# Patient Record
Sex: FEMALE | Race: WHITE | NOT HISPANIC OR LATINO | Employment: OTHER | URBAN - NONMETROPOLITAN AREA
[De-identification: names, ages, dates, MRNs, and addresses within clinical notes are randomized per-mention and may not be internally consistent; named-entity substitution may affect disease eponyms.]

---

## 2023-02-13 PROBLEM — E78.5 HYPERLIPEMIA: Status: ACTIVE | Noted: 2023-02-13

## 2023-02-13 PROBLEM — R06.00 DYSPNEA: Status: ACTIVE | Noted: 2023-02-13

## 2023-02-13 PROBLEM — K26.9 DUODENAL ULCER: Status: ACTIVE | Noted: 2023-02-13

## 2023-02-13 PROBLEM — R53.1 GENERALIZED WEAKNESS: Status: ACTIVE | Noted: 2023-02-13

## 2023-02-13 PROBLEM — G47.00 INSOMNIA: Status: ACTIVE | Noted: 2023-02-13

## 2023-02-13 PROBLEM — M46.1 BILATERAL SACROILIITIS (CMS-HCC): Status: ACTIVE | Noted: 2023-02-13

## 2023-02-13 PROBLEM — L30.9 DERMATITIS: Status: ACTIVE | Noted: 2023-02-13

## 2023-02-13 PROBLEM — L82.1 SEBORRHEIC KERATOSIS: Status: ACTIVE | Noted: 2023-02-13

## 2023-02-13 PROBLEM — K63.5 COLON POLYPS: Status: ACTIVE | Noted: 2023-02-13

## 2023-02-13 PROBLEM — R26.2 DIFFICULTY IN WALKING: Status: ACTIVE | Noted: 2023-02-13

## 2023-02-13 PROBLEM — S39.012A STRAIN OF BACK: Status: ACTIVE | Noted: 2023-02-13

## 2023-02-13 PROBLEM — M54.9 BACK PAIN: Status: ACTIVE | Noted: 2023-02-13

## 2023-02-13 PROBLEM — G47.33 OSA (OBSTRUCTIVE SLEEP APNEA): Status: ACTIVE | Noted: 2023-02-13

## 2023-02-13 PROBLEM — R07.89 CHEST WALL PAIN: Status: ACTIVE | Noted: 2023-02-13

## 2023-02-13 PROBLEM — R09.81 SINUS CONGESTION: Status: ACTIVE | Noted: 2023-02-13

## 2023-02-13 PROBLEM — E03.9 HYPOTHYROIDISM: Status: ACTIVE | Noted: 2023-02-13

## 2023-02-13 PROBLEM — Z86.39 H/O HYPERGLYCEMIA: Status: ACTIVE | Noted: 2023-02-13

## 2023-02-13 PROBLEM — K14.6 TONGUE PAIN: Status: ACTIVE | Noted: 2023-02-13

## 2023-02-13 PROBLEM — G47.9 DIFFICULTY SLEEPING: Status: ACTIVE | Noted: 2023-02-13

## 2023-02-13 PROBLEM — M81.0 OSTEOPOROSIS: Status: ACTIVE | Noted: 2023-02-13

## 2023-02-13 PROBLEM — M25.551 RIGHT HIP PAIN: Status: ACTIVE | Noted: 2023-02-13

## 2023-02-13 PROBLEM — B00.9 RECURRENT HSV (HERPES SIMPLEX VIRUS): Status: ACTIVE | Noted: 2023-02-13

## 2023-02-13 PROBLEM — R32 URINARY INCONTINENCE IN FEMALE: Status: ACTIVE | Noted: 2023-02-13

## 2023-02-13 PROBLEM — K21.9 GERD (GASTROESOPHAGEAL REFLUX DISEASE): Status: ACTIVE | Noted: 2023-02-13

## 2023-02-13 PROBLEM — R39.9 URINARY SYMPTOM OR SIGN: Status: ACTIVE | Noted: 2023-02-13

## 2023-02-13 PROBLEM — I10 BENIGN ESSENTIAL HYPERTENSION: Status: ACTIVE | Noted: 2023-02-13

## 2023-02-13 PROBLEM — J18.9 PNEUMONIA: Status: ACTIVE | Noted: 2023-02-13

## 2023-02-13 PROBLEM — R58 ECCHYMOSIS: Status: ACTIVE | Noted: 2023-02-13

## 2023-02-13 PROBLEM — J30.9 ALLERGIC RHINITIS: Status: ACTIVE | Noted: 2023-02-13

## 2023-02-13 PROBLEM — J45.909 ASTHMA (HHS-HCC): Status: ACTIVE | Noted: 2023-02-13

## 2023-02-13 PROBLEM — M16.10 HIP ARTHRITIS: Status: ACTIVE | Noted: 2023-02-13

## 2023-02-13 PROBLEM — J01.90 ACUTE SINUSITIS: Status: ACTIVE | Noted: 2023-02-13

## 2023-02-13 PROBLEM — R10.9 RIGHT FLANK PAIN: Status: ACTIVE | Noted: 2023-02-13

## 2023-02-13 PROBLEM — R06.02 SHORTNESS OF BREATH ON EXERTION: Status: ACTIVE | Noted: 2023-02-13

## 2023-02-13 PROBLEM — E55.9 VITAMIN D DEFICIENCY: Status: ACTIVE | Noted: 2023-02-13

## 2023-02-13 PROBLEM — F41.1 GENERALIZED ANXIETY DISORDER: Status: ACTIVE | Noted: 2023-02-13

## 2023-02-13 PROBLEM — M54.17 RADICULITIS, LUMBOSACRAL: Status: ACTIVE | Noted: 2023-02-13

## 2023-02-13 PROBLEM — F41.8 DEPRESSION WITH ANXIETY: Status: ACTIVE | Noted: 2023-02-13

## 2023-02-13 PROBLEM — R42 VERTIGO: Status: ACTIVE | Noted: 2023-02-13

## 2023-02-13 PROBLEM — M48.061 SPINAL STENOSIS, LUMBAR: Status: ACTIVE | Noted: 2023-02-13

## 2023-02-13 PROBLEM — M53.9 MULTILEVEL DEGENERATIVE DISC DISEASE: Status: ACTIVE | Noted: 2023-02-13

## 2023-02-13 PROBLEM — K57.30 DIVERTICULOSIS OF COLON WITHOUT HEMORRHAGE: Status: ACTIVE | Noted: 2023-02-13

## 2023-02-13 PROBLEM — R05.9 COUGH: Status: ACTIVE | Noted: 2023-02-13

## 2023-02-13 PROBLEM — G70.00 MYASTHENIA GRAVIS (MULTI): Status: ACTIVE | Noted: 2023-02-13

## 2023-02-13 PROBLEM — T21.14XA: Status: ACTIVE | Noted: 2023-02-13

## 2023-02-13 RX ORDER — LORAZEPAM 0.5 MG/1
0.5 TABLET ORAL 3 TIMES DAILY PRN
COMMUNITY
Start: 2021-07-28 | End: 2023-04-05 | Stop reason: ALTCHOICE

## 2023-02-13 RX ORDER — TRIAMCINOLONE ACETONIDE 1 MG/ML
LOTION TOPICAL 2 TIMES DAILY
COMMUNITY
Start: 2021-03-03 | End: 2023-04-05 | Stop reason: ALTCHOICE

## 2023-02-13 RX ORDER — PANTOPRAZOLE SODIUM 40 MG/1
40 TABLET, DELAYED RELEASE ORAL
COMMUNITY
Start: 2018-05-23 | End: 2023-04-05 | Stop reason: SDUPTHER

## 2023-02-13 RX ORDER — FLUTICASONE PROPIONATE 50 MCG
2 SPRAY, SUSPENSION (ML) NASAL
COMMUNITY
Start: 2017-11-27 | End: 2023-06-29

## 2023-02-13 RX ORDER — LORATADINE 10 MG/1
TABLET ORAL
COMMUNITY

## 2023-02-13 RX ORDER — ESCITALOPRAM OXALATE 10 MG/1
10 TABLET ORAL
COMMUNITY
Start: 2018-11-27 | End: 2024-01-19

## 2023-02-13 RX ORDER — LOSARTAN POTASSIUM 100 MG/1
100 TABLET ORAL
COMMUNITY
Start: 2019-12-02 | End: 2023-04-05 | Stop reason: SDUPTHER

## 2023-02-13 RX ORDER — DENOSUMAB 60 MG/ML
60 INJECTION SUBCUTANEOUS
COMMUNITY
Start: 2021-03-03

## 2023-02-13 RX ORDER — PYRIDOSTIGMINE BROMIDE 60 MG/1
60 TABLET ORAL 3 TIMES DAILY
COMMUNITY
Start: 2018-05-29

## 2023-02-13 RX ORDER — ERGOCALCIFEROL (VITAMIN D2) 50 MCG
2000 CAPSULE ORAL
COMMUNITY
Start: 2018-02-05 | End: 2024-04-24 | Stop reason: WASHOUT

## 2023-02-13 RX ORDER — MYCOPHENOLATE MOFETIL 500 MG/1
1000 TABLET ORAL 2 TIMES DAILY
COMMUNITY
Start: 2018-04-12

## 2023-02-13 RX ORDER — TRAZODONE HYDROCHLORIDE 50 MG/1
50 TABLET ORAL NIGHTLY
COMMUNITY
Start: 2018-07-17 | End: 2023-04-05 | Stop reason: SDUPTHER

## 2023-02-13 RX ORDER — AMLODIPINE BESYLATE 10 MG/1
10 TABLET ORAL
COMMUNITY
Start: 2018-12-19 | End: 2023-04-05 | Stop reason: SDUPTHER

## 2023-02-13 RX ORDER — MELOXICAM 7.5 MG/1
7.5 TABLET ORAL DAILY PRN
COMMUNITY
Start: 2022-08-03 | End: 2023-04-05 | Stop reason: ALTCHOICE

## 2023-02-13 RX ORDER — ALBUTEROL SULFATE 90 UG/1
2 AEROSOL, METERED RESPIRATORY (INHALATION) EVERY 4 HOURS PRN
COMMUNITY
Start: 2017-04-05 | End: 2023-10-26

## 2023-02-13 RX ORDER — ESCITALOPRAM OXALATE 5 MG/1
5 TABLET ORAL
COMMUNITY
Start: 2019-11-06 | End: 2023-04-05 | Stop reason: SDUPTHER

## 2023-02-13 RX ORDER — HYDROCHLOROTHIAZIDE 12.5 MG/1
12.5 CAPSULE ORAL
COMMUNITY
Start: 2020-08-11 | End: 2023-08-14

## 2023-02-13 RX ORDER — VALACYCLOVIR HYDROCHLORIDE 1 G/1
1 TABLET, FILM COATED ORAL
COMMUNITY
Start: 2019-05-10 | End: 2024-06-06

## 2023-02-13 RX ORDER — PRAVASTATIN SODIUM 10 MG/1
10 TABLET ORAL
COMMUNITY
Start: 2015-08-18 | End: 2023-04-05 | Stop reason: SDUPTHER

## 2023-02-13 RX ORDER — BLOOD SUGAR DIAGNOSTIC
STRIP MISCELLANEOUS
COMMUNITY
Start: 2020-08-11 | End: 2023-04-05 | Stop reason: SDUPTHER

## 2023-02-13 RX ORDER — GLUCOSAMINE/CHONDR SU A SOD 750-600 MG
5-10 TABLET ORAL NIGHTLY
COMMUNITY

## 2023-02-13 RX ORDER — MONTELUKAST SODIUM 10 MG/1
10 TABLET ORAL
COMMUNITY
Start: 2022-01-12 | End: 2023-04-05 | Stop reason: SDUPTHER

## 2023-02-13 RX ORDER — LEVOTHYROXINE SODIUM 112 UG/1
112 TABLET ORAL
COMMUNITY
Start: 2021-08-17 | End: 2023-04-05 | Stop reason: SDUPTHER

## 2023-02-13 RX ORDER — BLOOD SUGAR DIAGNOSTIC
STRIP MISCELLANEOUS
COMMUNITY
Start: 2018-02-05 | End: 2023-04-05 | Stop reason: ALTCHOICE

## 2023-02-13 RX ORDER — BUDESONIDE AND FORMOTEROL FUMARATE DIHYDRATE 80; 4.5 UG/1; UG/1
2 AEROSOL RESPIRATORY (INHALATION) 2 TIMES DAILY
COMMUNITY
Start: 2021-11-12 | End: 2023-04-05 | Stop reason: SDUPTHER

## 2023-04-04 PROBLEM — L30.9 DERMATITIS: Status: RESOLVED | Noted: 2023-02-13 | Resolved: 2023-04-04

## 2023-04-04 PROBLEM — R05.9 COUGH: Status: RESOLVED | Noted: 2023-02-13 | Resolved: 2023-04-04

## 2023-04-04 PROBLEM — R58 ECCHYMOSIS: Status: RESOLVED | Noted: 2023-02-13 | Resolved: 2023-04-04

## 2023-04-04 PROBLEM — T21.14XA: Status: RESOLVED | Noted: 2023-02-13 | Resolved: 2023-04-04

## 2023-04-04 PROBLEM — R09.81 SINUS CONGESTION: Status: RESOLVED | Noted: 2023-02-13 | Resolved: 2023-04-04

## 2023-04-04 PROBLEM — J01.90 ACUTE SINUSITIS: Status: RESOLVED | Noted: 2023-02-13 | Resolved: 2023-04-04

## 2023-04-04 PROBLEM — R07.89 CHEST WALL PAIN: Status: RESOLVED | Noted: 2023-02-13 | Resolved: 2023-04-04

## 2023-04-04 PROBLEM — M54.9 BACK PAIN: Status: RESOLVED | Noted: 2023-02-13 | Resolved: 2023-04-04

## 2023-04-04 PROBLEM — Z86.39 H/O HYPERGLYCEMIA: Status: RESOLVED | Noted: 2023-02-13 | Resolved: 2023-04-04

## 2023-04-04 PROBLEM — J18.9 PNEUMONIA: Status: RESOLVED | Noted: 2023-02-13 | Resolved: 2023-04-04

## 2023-04-05 ENCOUNTER — OFFICE VISIT (OUTPATIENT)
Dept: PRIMARY CARE | Facility: CLINIC | Age: 73
End: 2023-04-05
Payer: MEDICARE

## 2023-04-05 VITALS
DIASTOLIC BLOOD PRESSURE: 67 MMHG | WEIGHT: 222.4 LBS | RESPIRATION RATE: 12 BRPM | TEMPERATURE: 98.6 F | BODY MASS INDEX: 39.41 KG/M2 | HEIGHT: 63 IN | HEART RATE: 66 BPM | SYSTOLIC BLOOD PRESSURE: 121 MMHG | OXYGEN SATURATION: 95 %

## 2023-04-05 DIAGNOSIS — E03.9 HYPOTHYROIDISM, UNSPECIFIED TYPE: ICD-10-CM

## 2023-04-05 DIAGNOSIS — D22.9 MULTIPLE ATYPICAL SKIN MOLES: ICD-10-CM

## 2023-04-05 DIAGNOSIS — F51.01 PRIMARY INSOMNIA: ICD-10-CM

## 2023-04-05 DIAGNOSIS — F41.1 GENERALIZED ANXIETY DISORDER: ICD-10-CM

## 2023-04-05 DIAGNOSIS — M81.8 OTHER OSTEOPOROSIS, UNSPECIFIED PATHOLOGICAL FRACTURE PRESENCE: ICD-10-CM

## 2023-04-05 DIAGNOSIS — E78.5 HYPERLIPIDEMIA, UNSPECIFIED HYPERLIPIDEMIA TYPE: ICD-10-CM

## 2023-04-05 DIAGNOSIS — R42 VERTIGO: ICD-10-CM

## 2023-04-05 DIAGNOSIS — J32.9 CHRONIC CONGESTION OF PARANASAL SINUS: ICD-10-CM

## 2023-04-05 DIAGNOSIS — E11.9 CONTROLLED TYPE 2 DIABETES MELLITUS WITHOUT COMPLICATION, WITHOUT LONG-TERM CURRENT USE OF INSULIN (MULTI): ICD-10-CM

## 2023-04-05 DIAGNOSIS — J45.30 MILD PERSISTENT ASTHMA WITHOUT COMPLICATION (HHS-HCC): ICD-10-CM

## 2023-04-05 DIAGNOSIS — F41.8 DEPRESSION WITH ANXIETY: ICD-10-CM

## 2023-04-05 DIAGNOSIS — I10 PRIMARY HYPERTENSION: ICD-10-CM

## 2023-04-05 DIAGNOSIS — M53.9 MULTILEVEL DEGENERATIVE DISC DISEASE: ICD-10-CM

## 2023-04-05 DIAGNOSIS — K21.9 GASTROESOPHAGEAL REFLUX DISEASE WITHOUT ESOPHAGITIS: ICD-10-CM

## 2023-04-05 DIAGNOSIS — Z00.00 ROUTINE GENERAL MEDICAL EXAMINATION AT HEALTH CARE FACILITY: Primary | ICD-10-CM

## 2023-04-05 DIAGNOSIS — G47.33 OSA (OBSTRUCTIVE SLEEP APNEA): ICD-10-CM

## 2023-04-05 PROBLEM — R39.9 URINARY SYMPTOM OR SIGN: Status: RESOLVED | Noted: 2023-02-13 | Resolved: 2023-04-05

## 2023-04-05 PROBLEM — R49.0 DYSPHONIA: Status: ACTIVE | Noted: 2018-01-21

## 2023-04-05 PROBLEM — J45.909 ASTHMA (HHS-HCC): Status: RESOLVED | Noted: 2023-02-13 | Resolved: 2023-04-05

## 2023-04-05 PROBLEM — G47.9 DIFFICULTY SLEEPING: Status: RESOLVED | Noted: 2023-02-13 | Resolved: 2023-04-05

## 2023-04-05 PROBLEM — M48.061 SPINAL STENOSIS, LUMBAR: Status: RESOLVED | Noted: 2023-02-13 | Resolved: 2023-04-05

## 2023-04-05 PROBLEM — R10.9 RIGHT FLANK PAIN: Status: RESOLVED | Noted: 2023-02-13 | Resolved: 2023-04-05

## 2023-04-05 PROBLEM — G70.00 MYASTHENIA GRAVIS (MULTI): Status: RESOLVED | Noted: 2023-02-13 | Resolved: 2023-04-05

## 2023-04-05 PROBLEM — R13.10 DYSPHAGIA: Status: ACTIVE | Noted: 2018-01-21

## 2023-04-05 PROBLEM — S39.012A STRAIN OF BACK: Status: RESOLVED | Noted: 2023-02-13 | Resolved: 2023-04-05

## 2023-04-05 PROCEDURE — 1170F FXNL STATUS ASSESSED: CPT | Performed by: FAMILY MEDICINE

## 2023-04-05 PROCEDURE — 3074F SYST BP LT 130 MM HG: CPT | Performed by: FAMILY MEDICINE

## 2023-04-05 PROCEDURE — 1157F ADVNC CARE PLAN IN RCRD: CPT | Performed by: FAMILY MEDICINE

## 2023-04-05 PROCEDURE — 1160F RVW MEDS BY RX/DR IN RCRD: CPT | Performed by: FAMILY MEDICINE

## 2023-04-05 PROCEDURE — 1036F TOBACCO NON-USER: CPT | Performed by: FAMILY MEDICINE

## 2023-04-05 PROCEDURE — 3078F DIAST BP <80 MM HG: CPT | Performed by: FAMILY MEDICINE

## 2023-04-05 PROCEDURE — 1159F MED LIST DOCD IN RCRD: CPT | Performed by: FAMILY MEDICINE

## 2023-04-05 PROCEDURE — G0439 PPPS, SUBSEQ VISIT: HCPCS | Performed by: FAMILY MEDICINE

## 2023-04-05 PROCEDURE — 4010F ACE/ARB THERAPY RXD/TAKEN: CPT | Performed by: FAMILY MEDICINE

## 2023-04-05 RX ORDER — BLOOD SUGAR DIAGNOSTIC
STRIP MISCELLANEOUS
Qty: 50 STRIP | Refills: 2 | Status: SHIPPED | OUTPATIENT
Start: 2023-04-05

## 2023-04-05 RX ORDER — ESCITALOPRAM OXALATE 5 MG/1
5 TABLET ORAL
Qty: 90 TABLET | Refills: 2 | Status: SHIPPED | OUTPATIENT
Start: 2023-04-05 | End: 2023-12-08

## 2023-04-05 RX ORDER — AMLODIPINE BESYLATE 10 MG/1
10 TABLET ORAL
Qty: 90 TABLET | Refills: 2 | Status: SHIPPED | OUTPATIENT
Start: 2023-04-05 | End: 2023-08-14

## 2023-04-05 RX ORDER — LOSARTAN POTASSIUM 100 MG/1
100 TABLET ORAL
Qty: 90 TABLET | Refills: 2 | Status: SHIPPED | OUTPATIENT
Start: 2023-04-05 | End: 2023-08-14

## 2023-04-05 RX ORDER — BUDESONIDE AND FORMOTEROL FUMARATE DIHYDRATE 80; 4.5 UG/1; UG/1
2 AEROSOL RESPIRATORY (INHALATION) 2 TIMES DAILY
Qty: 1 EACH | Refills: 2 | Status: SHIPPED | OUTPATIENT
Start: 2023-04-05 | End: 2023-09-05

## 2023-04-05 RX ORDER — PANTOPRAZOLE SODIUM 40 MG/1
40 TABLET, DELAYED RELEASE ORAL
Qty: 90 TABLET | Refills: 2 | Status: SHIPPED | OUTPATIENT
Start: 2023-04-05 | End: 2023-12-05 | Stop reason: SDUPTHER

## 2023-04-05 RX ORDER — PRAVASTATIN SODIUM 10 MG/1
10 TABLET ORAL
Qty: 90 TABLET | Refills: 3 | Status: SHIPPED | OUTPATIENT
Start: 2023-04-05 | End: 2023-12-05

## 2023-04-05 RX ORDER — LEVOTHYROXINE SODIUM 112 UG/1
112 TABLET ORAL
Qty: 90 TABLET | Refills: 3 | Status: SHIPPED | OUTPATIENT
Start: 2023-04-05 | End: 2023-12-05

## 2023-04-05 RX ORDER — MONTELUKAST SODIUM 10 MG/1
10 TABLET ORAL
Qty: 90 TABLET | Refills: 2 | Status: SHIPPED | OUTPATIENT
Start: 2023-04-05 | End: 2024-01-19

## 2023-04-05 RX ORDER — TRAZODONE HYDROCHLORIDE 50 MG/1
50 TABLET ORAL NIGHTLY
Qty: 90 TABLET | Refills: 3 | Status: SHIPPED | OUTPATIENT
Start: 2023-04-05 | End: 2024-06-04

## 2023-04-05 ASSESSMENT — PATIENT HEALTH QUESTIONNAIRE - PHQ9
1. LITTLE INTEREST OR PLEASURE IN DOING THINGS: MORE THAN HALF THE DAYS
6. FEELING BAD ABOUT YOURSELF - OR THAT YOU ARE A FAILURE OR HAVE LET YOURSELF OR YOUR FAMILY DOWN: SEVERAL DAYS
10. IF YOU CHECKED OFF ANY PROBLEMS, HOW DIFFICULT HAVE THESE PROBLEMS MADE IT FOR YOU TO DO YOUR WORK, TAKE CARE OF THINGS AT HOME, OR GET ALONG WITH OTHER PEOPLE: SOMEWHAT DIFFICULT
9. THOUGHTS THAT YOU WOULD BE BETTER OFF DEAD, OR OF HURTING YOURSELF: NOT AT ALL
SUM OF ALL RESPONSES TO PHQ9 QUESTIONS 1 AND 2: 4
3. TROUBLE FALLING OR STAYING ASLEEP OR SLEEPING TOO MUCH: SEVERAL DAYS
SUM OF ALL RESPONSES TO PHQ QUESTIONS 1-9: 11
4. FEELING TIRED OR HAVING LITTLE ENERGY: NEARLY EVERY DAY
7. TROUBLE CONCENTRATING ON THINGS, SUCH AS READING THE NEWSPAPER OR WATCHING TELEVISION: NOT AT ALL
5. POOR APPETITE OR OVEREATING: MORE THAN HALF THE DAYS
8. MOVING OR SPEAKING SO SLOWLY THAT OTHER PEOPLE COULD HAVE NOTICED. OR THE OPPOSITE, BEING SO FIGETY OR RESTLESS THAT YOU HAVE BEEN MOVING AROUND A LOT MORE THAN USUAL: NOT AT ALL
2. FEELING DOWN, DEPRESSED OR HOPELESS: MORE THAN HALF THE DAYS

## 2023-04-05 ASSESSMENT — ACTIVITIES OF DAILY LIVING (ADL)
TAKING_MEDICATION: INDEPENDENT
DRESSING: INDEPENDENT
MANAGING_FINANCES: INDEPENDENT
GROCERY_SHOPPING: INDEPENDENT
BATHING: INDEPENDENT
DOING_HOUSEWORK: INDEPENDENT

## 2023-04-05 ASSESSMENT — ANXIETY QUESTIONNAIRES
2. NOT BEING ABLE TO STOP OR CONTROL WORRYING: MORE THAN HALF THE DAYS
6. BECOMING EASILY ANNOYED OR IRRITABLE: SEVERAL DAYS
IF YOU CHECKED OFF ANY PROBLEMS ON THIS QUESTIONNAIRE, HOW DIFFICULT HAVE THESE PROBLEMS MADE IT FOR YOU TO DO YOUR WORK, TAKE CARE OF THINGS AT HOME, OR GET ALONG WITH OTHER PEOPLE: SOMEWHAT DIFFICULT
1. FEELING NERVOUS, ANXIOUS, OR ON EDGE: SEVERAL DAYS
GAD7 TOTAL SCORE: 8
3. WORRYING TOO MUCH ABOUT DIFFERENT THINGS: MORE THAN HALF THE DAYS
4. TROUBLE RELAXING: SEVERAL DAYS
5. BEING SO RESTLESS THAT IT IS HARD TO SIT STILL: NOT AT ALL
7. FEELING AFRAID AS IF SOMETHING AWFUL MIGHT HAPPEN: SEVERAL DAYS

## 2023-04-05 ASSESSMENT — ENCOUNTER SYMPTOMS
DEPRESSION: 1
OCCASIONAL FEELINGS OF UNSTEADINESS: 1
LOSS OF SENSATION IN FEET: 0

## 2023-04-05 NOTE — PROGRESS NOTES
"Subjective   Reason for Visit: Nam Bernardo is an 72 y.o. female here for a Medicare Wellness visit.          Reviewed all medications by prescribing practitioner or clinical pharmacist (such as prescriptions, OTCs, herbal therapies and supplements) and documented in the medical record.    Follow Up Visit, past visit note reviewed.   Concerns   1. Sometimes room spins when gets up from laying  2. Congested all the time, sinuses, andleft ear always plugged  3.  Tired  - when going to sleep and when getting up .   4. Moles to be checked.        Sleep :  10-11 pm  and up at 7 -8 am .  Multiple wakings not sure why .  Hx TEJINDER, further follow up  was postponed due to spouse's death.  Some knee sxs,may return to [facility] .  Patient Care Team:  Leah Williamson MD as PCP - General     Review of Systems    Objective   Vitals:  /67 (BP Location: Right arm, Patient Position: Sitting, BP Cuff Size: Adult)   Pulse 66   Temp 37 °C (98.6 °F) (Temporal)   Resp 12   Ht 1.6 m (5' 3\")   Wt 101 kg (222 lb 6.4 oz)   SpO2 95%   BMI 39.40 kg/m²       Physical Exam    Assessment/Plan   Problem List Items Addressed This Visit       Depression with anxiety    Relevant Medications    traZODone (Desyrel) 50 mg tablet    escitalopram (Lexapro) 5 mg tablet    Generalized anxiety disorder    GERD (gastroesophageal reflux disease)    Relevant Medications    pantoprazole (ProtoNix) 40 mg EC tablet    Hyperlipemia    Relevant Medications    pravastatin (Pravachol) 10 mg tablet    Hypothyroidism    Relevant Medications    levothyroxine (Synthroid, Levoxyl) 112 mcg tablet    Other Relevant Orders    TSH with reflex to Free T4 if abnormal    Insomnia    Relevant Medications    traZODone (Desyrel) 50 mg tablet    Multilevel degenerative disc disease    TEJINDER (obstructive sleep apnea)    Relevant Orders    Home sleep apnea test (HSAT)    Follow Up In [facility] - PCP    Osteoporosis    Vertigo    Controlled type 2 diabetes " mellitus without complication, without long-term current use of insulin (CMS/[facility])    Relevant Medications    OneTouch Ultra Test strip    Other Relevant Orders    Follow Up In [facility] - PCP    Hemoglobin A1C    Comprehensive Metabolic Panel     Other Visit Diagnoses       Routine general medical examination at health care facility    -  Primary    Mild persistent asthma without complication        Relevant Medications    budesonide-formoteroL (Symbicort) 80-4.5 mcg/actuation inhaler    montelukast (Singulair) 10 mg tablet    Primary hypertension        Relevant Medications    losartan (Cozaar) 100 mg tablet    amLODIPine (Norvasc) 10 mg tablet    Other Relevant Orders    Follow Up In [facility] - PCP    Chronic congestion of paranasal sinus        Relevant Orders    Referral to ENT

## 2023-04-05 NOTE — LETTER
April 5, 2023     Patient: Nam Bernardo   YOB: 1950   Date of Visit: 4/5/2023       To Whom It May Concern:    It is my medical opinion that Ms. Bernardo requires a disability parking placard for the following reasons:  She cannot walk without assistance from another person or the use of an assistance device (cane, crutch, prosthetic device, wheelchair, etc.).  Duration of need: permanent    If you have any questions or concerns, please don't hesitate to call.         Sincerely,        Leah Williamson MD

## 2023-04-21 ENCOUNTER — OFFICE VISIT (OUTPATIENT)
Dept: PRIMARY CARE | Facility: CLINIC | Age: 73
End: 2023-04-21
Payer: MEDICARE

## 2023-04-21 ENCOUNTER — APPOINTMENT (OUTPATIENT)
Dept: PRIMARY CARE | Facility: CLINIC | Age: 73
End: 2023-04-21
Payer: MEDICARE

## 2023-04-21 VITALS
BODY MASS INDEX: 39.57 KG/M2 | HEART RATE: 71 BPM | OXYGEN SATURATION: 95 % | DIASTOLIC BLOOD PRESSURE: 64 MMHG | WEIGHT: 223.4 LBS | TEMPERATURE: 98.4 F | RESPIRATION RATE: 12 BRPM | SYSTOLIC BLOOD PRESSURE: 124 MMHG

## 2023-04-21 DIAGNOSIS — R09.81 SINUS CONGESTION: ICD-10-CM

## 2023-04-21 DIAGNOSIS — R42 VERTIGO: Primary | ICD-10-CM

## 2023-04-21 PROCEDURE — 3078F DIAST BP <80 MM HG: CPT | Performed by: FAMILY MEDICINE

## 2023-04-21 PROCEDURE — 1159F MED LIST DOCD IN RCRD: CPT | Performed by: FAMILY MEDICINE

## 2023-04-21 PROCEDURE — 1160F RVW MEDS BY RX/DR IN RCRD: CPT | Performed by: FAMILY MEDICINE

## 2023-04-21 PROCEDURE — 1157F ADVNC CARE PLAN IN RCRD: CPT | Performed by: FAMILY MEDICINE

## 2023-04-21 PROCEDURE — 99213 OFFICE O/P EST LOW 20 MIN: CPT | Performed by: FAMILY MEDICINE

## 2023-04-21 PROCEDURE — 3074F SYST BP LT 130 MM HG: CPT | Performed by: FAMILY MEDICINE

## 2023-04-21 PROCEDURE — 4010F ACE/ARB THERAPY RXD/TAKEN: CPT | Performed by: FAMILY MEDICINE

## 2023-04-21 PROCEDURE — 1036F TOBACCO NON-USER: CPT | Performed by: FAMILY MEDICINE

## 2023-04-21 RX ORDER — MECLIZINE HCL 12.5 MG 12.5 MG/1
12.5 TABLET ORAL 3 TIMES DAILY PRN
Qty: 12 TABLET | Refills: 0 | Status: SHIPPED | OUTPATIENT
Start: 2023-04-21 | End: 2023-04-25

## 2023-04-21 RX ORDER — AZITHROMYCIN 250 MG/1
TABLET, FILM COATED ORAL
Qty: 6 TABLET | Refills: 0 | Status: SHIPPED | OUTPATIENT
Start: 2023-04-21 | End: 2023-04-26

## 2023-04-21 NOTE — PROGRESS NOTES
Subjective:    Nam Bernardo is a 72 y.o. female who presents for No chief complaint on file.    PCP is ZAHIDA    HPI:    Has pt missed any dose of her BP medication?        What concern/ problem/pain/symptom  brings you here today?      how long has pt had sxs?      describe symptoms-      how often do symptoms occur?      has pt tried anything for current symptoms, including medications (OTC or prescription)  ?        what makes symptoms worse?      has pt been seen recently for this problem ( within past 2-3 weeks) ?    if yes- where?    by who?    what treatment was provided?    Pt has hx HTN- on amlodipine , hydrochlorothiazide, losartan     labs 4/5/23- were ordered     Future Appointments   Date Time Provider Department Center          7/17/2023  1:00 PM Leah Williamson MD [id number]         Social History     Tobacco Use   Smoking Status Never   Smokeless Tobacco Never   Vaping Use   Vaping Status Not on file         Review of Systems:      Objective:    There were no vitals filed for this visit.      Patient is alert and oriented x 3 , NAD  HEAD- normocephalic and atraumatic   EYES-conjunctiva-normal, lids - normal  EARS/NOSE- normal external exam   NECK-supple,FROM  CV- RRR without murmur  PULM- CTA bilaterally, normal respiratory effort  RESPIRATORY EFFORT- normal , no retractions or nasal flaring   ABD- normoactive BS's   EXT- no edema,NT  SKIN- no abnormal skin lesions noted  NEURO- no focal deficits  PSYCH- pleasant, normal judgement and insight                Assessment/Plan:    1. Benign essential hypertension            No orders of the defined types were placed in this encounter.            Call if no better or if symptoms worsen

## 2023-04-21 NOTE — PROGRESS NOTES
Subjective   Patient ID: Nam Bernardo is a 72 y.o. female who presents for Hypertension (/87 [place] fire station. Has been feeling dizzy. Has gone down since yesterday but still high at home readings.).  HPI  Sinus ss.  Dizziness ; spinning .  Sl nausea .    Had checked bp at home bc of diziness.  Elevated at home and at Fire [employer] .      Review of Systems    Objective   /64 (BP Location: Right arm, Patient Position: Sitting, BP Cuff Size: Large adult)   Pulse 71   Temp 36.9 °C (98.4 °F) (Temporal)   Resp 12   Wt 101 kg (223 lb 6.4 oz)   SpO2 95%   BMI 39.57 kg/m²     TM normal, no redness, infection.  Neck: no lymph nodes. Nose: congested. Throat pnd    Physical Exam  Vitals reviewed.   Constitutional:       General: She is not in acute distress.  Cardiovascular:      Rate and Rhythm: Normal rate and regular rhythm.      Heart sounds: Normal heart sounds.   Pulmonary:      Effort: Pulmonary effort is normal.      Breath sounds: No wheezing or rales.   Neurological:      General: No focal deficit present.      Mental Status: She is alert.         Assessment/Plan   Problem List Items Addressed This Visit          Medium    Vertigo - Primary    Relevant Medications    meclizine (Antivert) 12.5 mg tablet     Other Visit Diagnoses       Sinus congestion        Relevant Medications    azithromycin (Zithromax) 250 mg tablet            Elevated BP - normal hhere; reassurance.   Vertigo, congestion-  has had both these issues. Fear of illness triggereing myasthenia gravis flare.     Tx prescribed.  Offered PT for VT , she declines for now    MASOUD Williamson MD

## 2023-05-09 DIAGNOSIS — G47.33 OSA (OBSTRUCTIVE SLEEP APNEA): ICD-10-CM

## 2023-05-12 ENCOUNTER — TELEPHONE (OUTPATIENT)
Dept: PRIMARY CARE | Facility: CLINIC | Age: 73
End: 2023-05-12
Payer: MEDICARE

## 2023-05-12 DIAGNOSIS — G47.33 OSA (OBSTRUCTIVE SLEEP APNEA): Primary | ICD-10-CM

## 2023-05-12 NOTE — TELEPHONE ENCOUNTER
Abnormal Home Sleep study .   Called and discussed with Pt  . Recommend in lab sleep study ,  and titration .   Referral entered. Pt chose [facility] location :     [place] [facility], [place] [address]  [phone]  Fax [phone]    I gave her the phone number , advised to call to schedule.   We will fax the information .      20-Mar-2023 17:29

## 2023-05-12 NOTE — TELEPHONE ENCOUNTER
I am referring pt to have an in Lab Sleep Study .   Location     [place] [facility] [place]  [address] [id number]  [phone]  Fax [phone]    Pt is aware .     Please FAX -referral,  Demographics, and home sleep study results.     I printed the referral and Home Sleep Study . They are in  my outbox. Thx !!

## 2023-06-28 DIAGNOSIS — J30.9 ALLERGIC RHINITIS, UNSPECIFIED: ICD-10-CM

## 2023-06-29 RX ORDER — FLUTICASONE PROPIONATE 50 MCG
SPRAY, SUSPENSION (ML) NASAL
Qty: 16 G | Refills: 5 | Status: SHIPPED | OUTPATIENT
Start: 2023-06-29

## 2023-07-07 ENCOUNTER — APPOINTMENT (OUTPATIENT)
Dept: PRIMARY CARE | Facility: CLINIC | Age: 73
End: 2023-07-07
Payer: MEDICARE

## 2023-07-12 ENCOUNTER — LAB (OUTPATIENT)
Dept: LAB | Facility: LAB | Age: 73
End: 2023-07-12
Payer: MEDICARE

## 2023-07-12 DIAGNOSIS — E03.9 HYPOTHYROIDISM, UNSPECIFIED TYPE: ICD-10-CM

## 2023-07-12 DIAGNOSIS — E11.9 CONTROLLED TYPE 2 DIABETES MELLITUS WITHOUT COMPLICATION, WITHOUT LONG-TERM CURRENT USE OF INSULIN (MULTI): ICD-10-CM

## 2023-07-12 PROCEDURE — 84439 ASSAY OF FREE THYROXINE: CPT

## 2023-07-12 PROCEDURE — 36415 COLL VENOUS BLD VENIPUNCTURE: CPT

## 2023-07-12 PROCEDURE — 84443 ASSAY THYROID STIM HORMONE: CPT

## 2023-07-12 PROCEDURE — 80053 COMPREHEN METABOLIC PANEL: CPT

## 2023-07-12 PROCEDURE — 83036 HEMOGLOBIN GLYCOSYLATED A1C: CPT

## 2023-07-13 LAB
ALANINE AMINOTRANSFERASE (SGPT) (U/L) IN SER/PLAS: 14 U/L (ref 7–45)
ALBUMIN (G/DL) IN SER/PLAS: 4.6 G/DL (ref 3.4–5)
ALKALINE PHOSPHATASE (U/L) IN SER/PLAS: 55 U/L (ref 33–136)
ANION GAP IN SER/PLAS: 12 MMOL/L (ref 10–20)
ASPARTATE AMINOTRANSFERASE (SGOT) (U/L) IN SER/PLAS: 19 U/L (ref 9–39)
BILIRUBIN TOTAL (MG/DL) IN SER/PLAS: 0.6 MG/DL (ref 0–1.2)
CALCIUM (MG/DL) IN SER/PLAS: 9.7 MG/DL (ref 8.6–10.6)
CARBON DIOXIDE, TOTAL (MMOL/L) IN SER/PLAS: 28 MMOL/L (ref 21–32)
CHLORIDE (MMOL/L) IN SER/PLAS: 96 MMOL/L (ref 98–107)
CREATININE (MG/DL) IN SER/PLAS: 0.71 MG/DL (ref 0.5–1.05)
ESTIMATED AVERAGE GLUCOSE FOR HBA1C: 120 MG/DL
GFR FEMALE: 89 ML/MIN/1.73M2
GLUCOSE (MG/DL) IN SER/PLAS: 146 MG/DL (ref 74–99)
HEMOGLOBIN A1C/HEMOGLOBIN TOTAL IN BLOOD: 5.8 %
POTASSIUM (MMOL/L) IN SER/PLAS: 4.7 MMOL/L (ref 3.5–5.3)
PROTEIN TOTAL: 6.6 G/DL (ref 6.4–8.2)
SODIUM (MMOL/L) IN SER/PLAS: 131 MMOL/L (ref 136–145)
THYROTROPIN (MIU/L) IN SER/PLAS BY DETECTION LIMIT <= 0.05 MIU/L: 0.43 MIU/L (ref 0.44–3.98)
THYROXINE (T4) FREE (NG/DL) IN SER/PLAS: 1.35 NG/DL (ref 0.78–1.48)
UREA NITROGEN (MG/DL) IN SER/PLAS: 16 MG/DL (ref 6–23)

## 2023-07-17 ENCOUNTER — APPOINTMENT (OUTPATIENT)
Dept: PRIMARY CARE | Facility: CLINIC | Age: 73
End: 2023-07-17
Payer: MEDICARE

## 2023-07-20 ENCOUNTER — OFFICE VISIT (OUTPATIENT)
Dept: PRIMARY CARE | Facility: CLINIC | Age: 73
End: 2023-07-20
Payer: MEDICARE

## 2023-07-20 VITALS
WEIGHT: 223.8 LBS | OXYGEN SATURATION: 95 % | BODY MASS INDEX: 39.64 KG/M2 | HEART RATE: 67 BPM | SYSTOLIC BLOOD PRESSURE: 104 MMHG | DIASTOLIC BLOOD PRESSURE: 61 MMHG | TEMPERATURE: 97.8 F

## 2023-07-20 DIAGNOSIS — E11.9 CONTROLLED TYPE 2 DIABETES MELLITUS WITHOUT COMPLICATION, WITHOUT LONG-TERM CURRENT USE OF INSULIN (MULTI): ICD-10-CM

## 2023-07-20 DIAGNOSIS — J30.9 ALLERGIC RHINITIS, UNSPECIFIED SEASONALITY, UNSPECIFIED TRIGGER: Primary | ICD-10-CM

## 2023-07-20 DIAGNOSIS — E03.9 HYPOTHYROIDISM, UNSPECIFIED TYPE: ICD-10-CM

## 2023-07-20 DIAGNOSIS — E55.9 VITAMIN D DEFICIENCY: ICD-10-CM

## 2023-07-20 DIAGNOSIS — F41.8 DEPRESSION WITH ANXIETY: ICD-10-CM

## 2023-07-20 DIAGNOSIS — E78.5 HYPERLIPIDEMIA, UNSPECIFIED HYPERLIPIDEMIA TYPE: ICD-10-CM

## 2023-07-20 DIAGNOSIS — I10 PRIMARY HYPERTENSION: ICD-10-CM

## 2023-07-20 DIAGNOSIS — G47.33 OSA (OBSTRUCTIVE SLEEP APNEA): ICD-10-CM

## 2023-07-20 PROCEDURE — 99214 OFFICE O/P EST MOD 30 MIN: CPT | Performed by: FAMILY MEDICINE

## 2023-07-20 PROCEDURE — 3074F SYST BP LT 130 MM HG: CPT | Performed by: FAMILY MEDICINE

## 2023-07-20 PROCEDURE — 3044F HG A1C LEVEL LT 7.0%: CPT | Performed by: FAMILY MEDICINE

## 2023-07-20 PROCEDURE — 1159F MED LIST DOCD IN RCRD: CPT | Performed by: FAMILY MEDICINE

## 2023-07-20 PROCEDURE — 1036F TOBACCO NON-USER: CPT | Performed by: FAMILY MEDICINE

## 2023-07-20 PROCEDURE — 3078F DIAST BP <80 MM HG: CPT | Performed by: FAMILY MEDICINE

## 2023-07-20 PROCEDURE — 1160F RVW MEDS BY RX/DR IN RCRD: CPT | Performed by: FAMILY MEDICINE

## 2023-07-20 PROCEDURE — 1125F AMNT PAIN NOTED PAIN PRSNT: CPT | Performed by: FAMILY MEDICINE

## 2023-07-20 PROCEDURE — 1157F ADVNC CARE PLAN IN RCRD: CPT | Performed by: FAMILY MEDICINE

## 2023-07-20 PROCEDURE — 4010F ACE/ARB THERAPY RXD/TAKEN: CPT | Performed by: FAMILY MEDICINE

## 2023-07-20 RX ORDER — TRIAMCINOLONE ACETONIDE 1 MG/G
CREAM TOPICAL
COMMUNITY
Start: 2023-06-29 | End: 2024-01-26 | Stop reason: SDUPTHER

## 2023-07-20 ASSESSMENT — ENCOUNTER SYMPTOMS
FEVER: 0
SHORTNESS OF BREATH: 0
SORE THROAT: 0
WHEEZING: 0

## 2023-07-20 ASSESSMENT — PATIENT HEALTH QUESTIONNAIRE - PHQ9
5. POOR APPETITE OR OVEREATING: NEARLY EVERY DAY
SUM OF ALL RESPONSES TO PHQ9 QUESTIONS 1 AND 2: 5
2. FEELING DOWN, DEPRESSED OR HOPELESS: MORE THAN HALF THE DAYS
6. FEELING BAD ABOUT YOURSELF - OR THAT YOU ARE A FAILURE OR HAVE LET YOURSELF OR YOUR FAMILY DOWN: SEVERAL DAYS
SUM OF ALL RESPONSES TO PHQ QUESTIONS 1-9: 12
3. TROUBLE FALLING OR STAYING ASLEEP OR SLEEPING TOO MUCH: SEVERAL DAYS
1. LITTLE INTEREST OR PLEASURE IN DOING THINGS: NEARLY EVERY DAY
8. MOVING OR SPEAKING SO SLOWLY THAT OTHER PEOPLE COULD HAVE NOTICED. OR THE OPPOSITE, BEING SO FIGETY OR RESTLESS THAT YOU HAVE BEEN MOVING AROUND A LOT MORE THAN USUAL: NOT AT ALL
9. THOUGHTS THAT YOU WOULD BE BETTER OFF DEAD, OR OF HURTING YOURSELF: NOT AT ALL
7. TROUBLE CONCENTRATING ON THINGS, SUCH AS READING THE NEWSPAPER OR WATCHING TELEVISION: NOT AT ALL
4. FEELING TIRED OR HAVING LITTLE ENERGY: MORE THAN HALF THE DAYS
10. IF YOU CHECKED OFF ANY PROBLEMS, HOW DIFFICULT HAVE THESE PROBLEMS MADE IT FOR YOU TO DO YOUR WORK, TAKE CARE OF THINGS AT HOME, OR GET ALONG WITH OTHER PEOPLE: SOMEWHAT DIFFICULT

## 2023-07-20 ASSESSMENT — ANXIETY QUESTIONNAIRES
IF YOU CHECKED OFF ANY PROBLEMS ON THIS QUESTIONNAIRE, HOW DIFFICULT HAVE THESE PROBLEMS MADE IT FOR YOU TO DO YOUR WORK, TAKE CARE OF THINGS AT HOME, OR GET ALONG WITH OTHER PEOPLE: SOMEWHAT DIFFICULT
5. BEING SO RESTLESS THAT IT IS HARD TO SIT STILL: NOT AT ALL
7. FEELING AFRAID AS IF SOMETHING AWFUL MIGHT HAPPEN: NOT AT ALL
1. FEELING NERVOUS, ANXIOUS, OR ON EDGE: SEVERAL DAYS
2. NOT BEING ABLE TO STOP OR CONTROL WORRYING: SEVERAL DAYS
GAD7 TOTAL SCORE: 4
6. BECOMING EASILY ANNOYED OR IRRITABLE: SEVERAL DAYS
4. TROUBLE RELAXING: NOT AT ALL
3. WORRYING TOO MUCH ABOUT DIFFERENT THINGS: SEVERAL DAYS

## 2023-07-20 NOTE — PROGRESS NOTES
Subjective   Patient ID: Nam Bernardo is a 73 y.o. female who presents for Follow-up, Nasal Congestion (All the times feels congested. Feels like sometime does not get a full breath in to lungs, mostly when air quality was bad. /), and Swelling (Ankle swelling in both ankles ).      HPI    Last visit  4/2023   Interval    Visit w her M Gravisspecialist - all okay   Visit with Derm  Injection of Prolia   Saw ENT as per my advice, nasal spray advised, but she could not afford it.     Stressors - continues, has worries about family, stress in family relationships. Does not elaborate.     Congestion- nasal/ facial .  Not in chest    Edema- cleared by the morning, sl worse as the day goes on     Review of Systems   Constitutional:  Negative for fever.   HENT:  Negative for sore throat.    Respiratory:  Negative for shortness of breath and wheezing.    Cardiovascular:  Negative for chest pain.     Interested in wt loss options.  Eats more than when hungry, stress eater . Sugars are fine, off dm oral meds.   Craves sweets   Objective   /61 (BP Location: Left arm, Patient Position: Sitting, BP Cuff Size: Adult)   Pulse 67   Temp 36.6 °C (97.8 °F) (Temporal)   Wt 102 kg (223 lb 12.8 oz)   SpO2 95%   BMI 39.64 kg/m²     Physical Exam  Vitals reviewed.   Constitutional:       General: She is not in acute distress.  HENT:      Nose: Congestion and rhinorrhea present.   Eyes:      Extraocular Movements: Extraocular movements intact.      Conjunctiva/sclera: Conjunctivae normal.      Pupils: Pupils are equal, round, and reactive to light.   Cardiovascular:      Rate and Rhythm: Normal rate and regular rhythm.      Heart sounds: Normal heart sounds.   Pulmonary:      Effort: Pulmonary effort is normal.      Breath sounds: No wheezing or rales.   Skin:     General: Skin is warm and dry.      Comments: Trace ankle edema bilaterally     Neurological:      General: No focal deficit present.      Mental Status: She is  alert.         GAD7.  = 4   PHQ 9  = 12  [ August 2022 PHQ 9 = 14 ]    All labs okay / good  A1C  5.8  Assessment/Plan   Problem List Items Addressed This Visit          Medium    Allergic rhinitis - Primary    Controlled type 2 diabetes mellitus without complication, without long-term current use of insulin (CMS/[facility])    Relevant Orders    Hemoglobin A1C    Comprehensive Metabolic Panel    Depression with anxiety    Hyperlipemia    Relevant Orders    Lipid Panel    Follow Up In [facility] - [facility] - Medicare Annual    Hypothyroidism    Relevant Orders    TSH with reflex to Free T4 if abnormal    Follow Up In [facility] - [facility] - Medicare Annual    TEJINDER (obstructive sleep apnea)    Relevant Orders    CBC and Auto Differential    Follow Up In [facility] - [facility] - Medicare Annual    Vitamin D deficiency    Relevant Orders    Vitamin D, Total     Other Visit Diagnoses       Primary hypertension              Nasal congestion  - saline, flonase    - continue singulair ,  avoid triggers no signs of infx or need for antibx     TEJINDER  - will obtain results then review w pt   MDD, moderate  - offered support .  Continue Lexapro 15 mg      Wt gain  - postponed decision .  Enc her to think about therapy (pych)  she is not interested at this time  - enc movemetn. States she can't do much due to her MG .     MASOUD Williamson MD

## 2023-08-13 ENCOUNTER — TELEPHONE (OUTPATIENT)
Dept: PRIMARY CARE | Facility: CLINIC | Age: 73
End: 2023-08-13

## 2023-08-13 DIAGNOSIS — I10 PRIMARY HYPERTENSION: ICD-10-CM

## 2023-08-13 DIAGNOSIS — I10 ESSENTIAL (PRIMARY) HYPERTENSION: ICD-10-CM

## 2023-08-14 RX ORDER — HYDROCHLOROTHIAZIDE 12.5 MG/1
12.5 CAPSULE ORAL DAILY
Qty: 90 CAPSULE | Refills: 1 | Status: SHIPPED | OUTPATIENT
Start: 2023-08-14 | End: 2024-04-24 | Stop reason: SDUPTHER

## 2023-08-14 RX ORDER — AMLODIPINE BESYLATE 10 MG/1
10 TABLET ORAL DAILY
Qty: 90 TABLET | Refills: 1 | Status: SHIPPED | OUTPATIENT
Start: 2023-08-14 | End: 2024-01-16

## 2023-08-14 RX ORDER — LOSARTAN POTASSIUM 100 MG/1
100 TABLET ORAL DAILY
Qty: 90 TABLET | Refills: 1 | Status: SHIPPED | OUTPATIENT
Start: 2023-08-14 | End: 2024-01-16

## 2023-08-14 NOTE — TELEPHONE ENCOUNTER
"I cannot find the result.  The test was done at [facility] [place] 6/13/2023 .   For some reason, I see the encounter \"office Visit  \" but no result.     Can you please call the Sleep Center directly to ask for a copy of this result.     Thx.    "

## 2023-08-14 NOTE — TELEPHONE ENCOUNTER
Pt is calling for the results of her sleep study. Had the IN LAB study in June but has not heard back about the results yet..    Please advise - pt states okay to leave message

## 2023-08-15 ENCOUNTER — TELEPHONE (OUTPATIENT)
Dept: PRIMARY CARE | Facility: CLINIC | Age: 73
End: 2023-08-15
Payer: MEDICARE

## 2023-08-15 DIAGNOSIS — G47.33 OSA (OBSTRUCTIVE SLEEP APNEA): Primary | ICD-10-CM

## 2023-08-15 NOTE — TELEPHONE ENCOUNTER
Called and informed pt of this. Let pt know that lizeth ardon will be in contact with her upon return.

## 2023-08-15 NOTE — TELEPHONE ENCOUNTER
Reviewed In-Lab Sleep Study , it confirms Sleep Apnea.   They tried her on CPAP and I recommend trying it at home.  Will order it (we usually use a company called [employer])  . Siddharth MANE is the  in our office.     Please let pt know.  Apologize to her on my behalf, that I am just now telling her the results . I am very sorry .

## 2023-08-16 NOTE — TELEPHONE ENCOUNTER
Eugenia,   NEW order for autoPap and supplies ,  new set up .   Thanks.    
Faxed order, office note, sleep study and demographics to [facility].  
c/w keppra

## 2023-09-05 DIAGNOSIS — J45.30 MILD PERSISTENT ASTHMA WITHOUT COMPLICATION (HHS-HCC): ICD-10-CM

## 2023-09-05 RX ORDER — BUDESONIDE AND FORMOTEROL FUMARATE DIHYDRATE 80; 4.5 UG/1; UG/1
AEROSOL RESPIRATORY (INHALATION)
Qty: 10.2 G | Refills: 2 | Status: SHIPPED | OUTPATIENT
Start: 2023-09-05 | End: 2024-01-16

## 2023-09-19 ENCOUNTER — TELEPHONE (OUTPATIENT)
Dept: PRIMARY CARE | Facility: CLINIC | Age: 73
End: 2023-09-19
Payer: MEDICARE

## 2023-09-19 NOTE — TELEPHONE ENCOUNTER
Spoke with patient, she has made an appointment in October for CPAP.   She stated that if you want to see her sooner she will come in.   Per patient she has also tried 2 different mask.

## 2023-09-19 NOTE — TELEPHONE ENCOUNTER
Recommend office visit to review her symptoms and so I can examine the problem.     Please document-  did the patient already work with [employer] and switch to different masks to see if that helped ?

## 2023-09-19 NOTE — TELEPHONE ENCOUNTER
"Patient called regarding CPAP.  She is complaining of \"swollen bags under her eyes\".  She is concerned the pressure of her CPAP is either too high or the mask is too tight causing this.  She would like her pressure settings changed (this is what Brandon discussed with her) to see if it helps with her issue.    She would like a call back           "

## 2023-09-28 ENCOUNTER — TELEPHONE (OUTPATIENT)
Dept: PRIMARY CARE | Facility: CLINIC | Age: 73
End: 2023-09-28

## 2023-09-28 NOTE — TELEPHONE ENCOUNTER
Dahiana did not get [employer] rep's name they brought in a supply order that needs to be signed by Dr. Williamson.   I put in Dr. Williamson's box in her office.

## 2023-10-05 ENCOUNTER — OFFICE VISIT (OUTPATIENT)
Dept: PRIMARY CARE | Facility: CLINIC | Age: 73
End: 2023-10-05
Payer: MEDICARE

## 2023-10-05 VITALS
SYSTOLIC BLOOD PRESSURE: 122 MMHG | WEIGHT: 227.4 LBS | DIASTOLIC BLOOD PRESSURE: 63 MMHG | TEMPERATURE: 99 F | HEART RATE: 79 BPM | OXYGEN SATURATION: 93 % | BODY MASS INDEX: 40.28 KG/M2

## 2023-10-05 DIAGNOSIS — R05.1 ACUTE COUGH: Primary | ICD-10-CM

## 2023-10-05 DIAGNOSIS — R50.9 FEVER, UNSPECIFIED FEVER CAUSE: ICD-10-CM

## 2023-10-05 DIAGNOSIS — J45.901 MILD ASTHMA WITH EXACERBATION, UNSPECIFIED WHETHER PERSISTENT (HHS-HCC): ICD-10-CM

## 2023-10-05 PROCEDURE — 1159F MED LIST DOCD IN RCRD: CPT | Performed by: FAMILY MEDICINE

## 2023-10-05 PROCEDURE — 1036F TOBACCO NON-USER: CPT | Performed by: FAMILY MEDICINE

## 2023-10-05 PROCEDURE — 87632 RESP VIRUS 6-11 TARGETS: CPT

## 2023-10-05 PROCEDURE — 87636 SARSCOV2 & INF A&B AMP PRB: CPT

## 2023-10-05 PROCEDURE — 4010F ACE/ARB THERAPY RXD/TAKEN: CPT | Performed by: FAMILY MEDICINE

## 2023-10-05 PROCEDURE — 99214 OFFICE O/P EST MOD 30 MIN: CPT | Performed by: FAMILY MEDICINE

## 2023-10-05 PROCEDURE — 3044F HG A1C LEVEL LT 7.0%: CPT | Performed by: FAMILY MEDICINE

## 2023-10-05 PROCEDURE — 1160F RVW MEDS BY RX/DR IN RCRD: CPT | Performed by: FAMILY MEDICINE

## 2023-10-05 PROCEDURE — 1125F AMNT PAIN NOTED PAIN PRSNT: CPT | Performed by: FAMILY MEDICINE

## 2023-10-05 PROCEDURE — 3078F DIAST BP <80 MM HG: CPT | Performed by: FAMILY MEDICINE

## 2023-10-05 PROCEDURE — 3074F SYST BP LT 130 MM HG: CPT | Performed by: FAMILY MEDICINE

## 2023-10-05 RX ORDER — PREDNISONE 20 MG/1
20 TABLET ORAL DAILY
Qty: 5 TABLET | Refills: 0 | Status: SHIPPED | OUTPATIENT
Start: 2023-10-05 | End: 2023-10-10

## 2023-10-05 RX ORDER — PREDNISONE 20 MG/1
20 TABLET ORAL DAILY
Qty: 21 TABLET | Refills: 8 | Status: SHIPPED | OUTPATIENT
Start: 2023-10-05 | End: 2023-10-05 | Stop reason: SDUPTHER

## 2023-10-05 RX ORDER — DOXYCYCLINE 100 MG/1
100 CAPSULE ORAL 2 TIMES DAILY
Qty: 20 CAPSULE | Refills: 0 | Status: SHIPPED | OUTPATIENT
Start: 2023-10-05 | End: 2023-10-15

## 2023-10-05 NOTE — PROGRESS NOTES
Subjective   Patient ID: Nam Bernardo is a 73 y.o. female who presents for URI (Facial pain, sore throat, pain in chest, coughing. Symptoms have been going on since Sunday. Home covid tests were both negative. 101.0F at home. ).  HPI  Not feeling good for 4 days.   Was at a family wedding (her niece)  and thinks she picked something up there.  Fever, cough , some sinus pressure.      Hx of asthma and using Symbicort regularly .  Also on albuterol prn .     Review of Systems  No chest pain   General malaise ,  tired from coughing .    Incontinence- urinary, every time she coughs.       Objective   /63 (BP Location: Left arm, Patient Position: Sitting, BP Cuff Size: Adult)   Pulse 79   Temp 37.2 °C (99 °F) (Temporal)   Wt 103 kg (227 lb 6.4 oz)   SpO2 93%   BMI 40.28 kg/m²     Physical Exam  Constitutional:       General: She is not in acute distress.     Appearance: She is not toxic-appearing.   HENT:      Head: Normocephalic and atraumatic.      Right Ear: Tympanic membrane normal.      Left Ear: Tympanic membrane normal.      Nose: Rhinorrhea present.      Mouth/Throat:      Pharynx: Posterior oropharyngeal erythema present. No oropharyngeal exudate.   Eyes:      Extraocular Movements: Extraocular movements intact.      Conjunctiva/sclera: Conjunctivae normal.      Pupils: Pupils are equal, round, and reactive to light.   Cardiovascular:      Rate and Rhythm: Normal rate and regular rhythm.   Pulmonary:      Effort: Pulmonary effort is normal. No respiratory distress.      Breath sounds: Wheezing present.   Neurological:      Mental Status: She is alert.         Assessment/Plan   Problem List Items Addressed This Visit    None  Visit Diagnoses       Acute cough    -  Primary    Relevant Medications    doxycycline (Vibramycin) 100 mg capsule    predniSONE (Deltasone) 20 mg tablet    Other Relevant Orders    Sars-CoV-2 PCR, Symptomatic    Influenza A, and B PCR    RESPIRATORY PANEL PCR (NASOPHARYNX)     Fever, unspecified fever cause        Relevant Medications    doxycycline (Vibramycin) 100 mg capsule    predniSONE (Deltasone) 20 mg tablet    Other Relevant Orders    Sars-CoV-2 PCR, Symptomatic    Influenza A, and B PCR    RESPIRATORY PANEL PCR (NASOPHARYNX)    Mild asthma with exacerbation, unspecified whether persistent        Relevant Medications    doxycycline (Vibramycin) 100 mg capsule    predniSONE (Deltasone) 20 mg tablet            Viral infx vs pneumonia     Asthma Exac, Acute .     Prednisone for asthma exac ;  recommend tylenol for fevers; plenty of rest and fluids.     Follow up pending nasal swabs.        MASOUD Williamson MD

## 2023-10-06 ENCOUNTER — LAB REQUISITION (OUTPATIENT)
Dept: LAB | Facility: HOSPITAL | Age: 73
End: 2023-10-06
Payer: MEDICARE

## 2023-10-06 DIAGNOSIS — R50.9 FEVER, UNSPECIFIED: ICD-10-CM

## 2023-10-06 DIAGNOSIS — R05.1 ACUTE COUGH: ICD-10-CM

## 2023-10-06 LAB
FLUAV RNA RESP QL NAA+PROBE: NOT DETECTED
FLUBV RNA RESP QL NAA+PROBE: NOT DETECTED
SARS-COV-2 RNA RESP QL NAA+PROBE: NOT DETECTED

## 2023-10-07 LAB
ADENOVIRUS RVP, VIRC: NOT DETECTED
ENTEROVIRUS/RHINOVIRUS RVP, VIRC: POSITIVE
HUMAN BOCAVIRUS RVP, VIRC: NOT DETECTED
HUMAN CORONAVIRUS RVP, VIRC: NOT DETECTED
INFLUENZA A , VIRC: NOT DETECTED
INFLUENZA A H1N1-09 , VIRC: NOT DETECTED
INFLUENZA B PCR, VIRC: NOT DETECTED
METAPNEUMOVIRUS , VIRC: NOT DETECTED
PARAINFLUENZA PCR, VIRC: NOT DETECTED
RSV PCR, RVP, VIRC: NOT DETECTED

## 2023-10-10 ENCOUNTER — ANCILLARY PROCEDURE (OUTPATIENT)
Dept: RADIOLOGY | Facility: CLINIC | Age: 73
End: 2023-10-10
Payer: MEDICARE

## 2023-10-10 ENCOUNTER — TELEPHONE (OUTPATIENT)
Dept: PRIMARY CARE | Facility: CLINIC | Age: 73
End: 2023-10-10

## 2023-10-10 DIAGNOSIS — R50.9 FEVER, UNSPECIFIED FEVER CAUSE: ICD-10-CM

## 2023-10-10 DIAGNOSIS — Z77.120 MOLD EXPOSURE: ICD-10-CM

## 2023-10-10 DIAGNOSIS — R50.9 FEVER, UNSPECIFIED FEVER CAUSE: Primary | ICD-10-CM

## 2023-10-10 PROCEDURE — 71046 X-RAY EXAM CHEST 2 VIEWS: CPT | Mod: FY

## 2023-10-10 PROCEDURE — 71046 X-RAY EXAM CHEST 2 VIEWS: CPT | Performed by: STUDENT IN AN ORGANIZED HEALTH CARE EDUCATION/TRAINING PROGRAM

## 2023-10-10 NOTE — TELEPHONE ENCOUNTER
Patient called regarding labs done 10/5    Flu/covid came back negative but respiratory viral panel is not back yet.    1) Order states  incorrect on specimen, can you please re-order?  We will have patient come back for nurse visit if so.     She is feeling a little better but is still experiencing wheezing and overall a bit miserable     Prednisone finished today, still on doxycycline     2) She is requesting a chest xray be ordered at this point as well

## 2023-10-10 NOTE — TELEPHONE ENCOUNTER
Call the patient and tell her.  I placed the order for the chest x-ray.  Have her finish the doxycycline.  Please contact the lab to find out if the respiratory panel is still pending?  Please inform the patient her influenza and COVID testing were both negative  -

## 2023-10-10 NOTE — TELEPHONE ENCOUNTER
Per lab services, this got sent out and just came back.  They are working to get it released to chart (if it is not in there by tomorrow we will need to call help desk) but she said everything was negative aside from rhinovirus.     Will let patient know that she can get chest xray done

## 2023-10-11 NOTE — TELEPHONE ENCOUNTER
Patient called about xray result, she is aware it is not back yet but just wanted you to be aware of the last few messages.

## 2023-10-12 NOTE — TELEPHONE ENCOUNTER
STAFF   Inform pt that the chest xray is normal   Call lab and get a status report on the Respiratory Panel. Order is in process it has been a week. Is there anything preliminary that they can tell us?

## 2023-10-13 NOTE — TELEPHONE ENCOUNTER
I did put a ticket into the help desk for the result to come over.  Lab services said everything was negative other than rhinovirus which came back positive. This was the last update I received:    An Incident [id number] has been opened on your behalf.  Short description: respiratory viral panel     Patient is still experiencing a deep cough, congestion and overall fatigue after 8 days.

## 2023-10-16 NOTE — TELEPHONE ENCOUNTER
Noted.  Recommend appt in office because she has ongoing symptoms.   I can see her Weds 9 am  or 330 pm, as overbook.

## 2023-10-18 ENCOUNTER — OFFICE VISIT (OUTPATIENT)
Dept: PRIMARY CARE | Facility: CLINIC | Age: 73
End: 2023-10-18
Payer: MEDICARE

## 2023-10-18 ENCOUNTER — TELEPHONE (OUTPATIENT)
Dept: PRIMARY CARE | Facility: CLINIC | Age: 73
End: 2023-10-18

## 2023-10-18 VITALS
DIASTOLIC BLOOD PRESSURE: 67 MMHG | BODY MASS INDEX: 39.48 KG/M2 | SYSTOLIC BLOOD PRESSURE: 122 MMHG | OXYGEN SATURATION: 95 % | HEART RATE: 76 BPM | TEMPERATURE: 97.6 F | WEIGHT: 222.9 LBS

## 2023-10-18 DIAGNOSIS — R05.8 POST-VIRAL COUGH SYNDROME: Primary | ICD-10-CM

## 2023-10-18 DIAGNOSIS — G47.33 OSA (OBSTRUCTIVE SLEEP APNEA): ICD-10-CM

## 2023-10-18 DIAGNOSIS — B37.2 YEAST INFECTION OF THE SKIN: ICD-10-CM

## 2023-10-18 PROCEDURE — 1160F RVW MEDS BY RX/DR IN RCRD: CPT | Performed by: FAMILY MEDICINE

## 2023-10-18 PROCEDURE — 1159F MED LIST DOCD IN RCRD: CPT | Performed by: FAMILY MEDICINE

## 2023-10-18 PROCEDURE — 3044F HG A1C LEVEL LT 7.0%: CPT | Performed by: FAMILY MEDICINE

## 2023-10-18 PROCEDURE — 4010F ACE/ARB THERAPY RXD/TAKEN: CPT | Performed by: FAMILY MEDICINE

## 2023-10-18 PROCEDURE — 3078F DIAST BP <80 MM HG: CPT | Performed by: FAMILY MEDICINE

## 2023-10-18 PROCEDURE — 1125F AMNT PAIN NOTED PAIN PRSNT: CPT | Performed by: FAMILY MEDICINE

## 2023-10-18 PROCEDURE — 99214 OFFICE O/P EST MOD 30 MIN: CPT | Performed by: FAMILY MEDICINE

## 2023-10-18 PROCEDURE — 3074F SYST BP LT 130 MM HG: CPT | Performed by: FAMILY MEDICINE

## 2023-10-18 PROCEDURE — 1036F TOBACCO NON-USER: CPT | Performed by: FAMILY MEDICINE

## 2023-10-18 RX ORDER — NYSTATIN 100000 [USP'U]/G
1 POWDER TOPICAL 2 TIMES DAILY
Qty: 30 G | Refills: 3 | Status: SHIPPED | OUTPATIENT
Start: 2023-10-18 | End: 2024-02-15

## 2023-10-18 RX ORDER — PREDNISONE 10 MG/1
TABLET ORAL
Qty: 21 TABLET | Refills: 0 | Status: SHIPPED | OUTPATIENT
Start: 2023-10-18 | End: 2024-01-24 | Stop reason: ALTCHOICE

## 2023-10-18 NOTE — TELEPHONE ENCOUNTER
Siddharth-    This pt is new on CPAP, but due to illness and waiting for a mask  to arrive, really hasn't worn it for an adequate amt of time.  She was in to see me  today.    I am  concerned this lack of  documented time on the CPAP will cause problems with payment and supplies from [facility].   Is there anything you have received from them regarding a lack of time on CPAP ?  Leah

## 2023-10-18 NOTE — PROGRESS NOTES
Subjective   Patient ID: Nam Bernardo is a 73 y.o. female who presents for URI (Pt states that symptoms have improved since the 5th but that she can't get rid of the cough ), CPAP (Pt has no been able to use CPAP. Wondering if there is a letting to write that wont charge her. ), and Itching Groin (Pt states that her groin area is raw. Has been using powder on area. ).  HPI  1,  Coughing   - (+)  for rhinovirus   . Neg for Covid, Flu A/B  .  Coughing continues. No fever  . No cp  2. TEJINDER, CPAP followup . Concurrent illness for the last 3 weeks  .also getting new /different mask .     3.  Skin rash groin,  almost out of nystatin powder.     Review of Systems  No ear ache, st  , fevers .   Objective   /67 (BP Location: Left arm, Patient Position: Sitting, BP Cuff Size: Large adult)   Pulse 76   Temp 36.4 °C (97.6 °F) (Temporal)   Wt 101 kg (222 lb 14.4 oz)   SpO2 95%   BMI 39.48 kg/m²     Physical Exam  Vitals and nursing note reviewed.   Constitutional:       Appearance: Normal appearance.   HENT:      Head: Normocephalic and atraumatic.      Comments: Sinus pain on palpation      Right Ear: Tympanic membrane normal.      Left Ear: Tympanic membrane normal.      Nose: No congestion or rhinorrhea.      Mouth/Throat:      Pharynx: Posterior oropharyngeal erythema present. No oropharyngeal exudate.   Eyes:      Extraocular Movements: Extraocular movements intact.      Conjunctiva/sclera: Conjunctivae normal.      Pupils: Pupils are equal, round, and reactive to light.   Cardiovascular:      Rate and Rhythm: Normal rate and regular rhythm.   Pulmonary:      Effort: Pulmonary effort is normal.      Breath sounds: Normal breath sounds.   Musculoskeletal:      Cervical back: No tenderness.   Lymphadenopathy:      Cervical: No cervical adenopathy.   Neurological:      Mental Status: She is alert.   Skin:  erythematous eruption, inguinal area, left   Assessment/Plan   Problem List Items Addressed This Visit           Medium    TEJINDER (obstructive sleep apnea)     Other Visit Diagnoses       Post-viral cough syndrome    -  Primary    Relevant Medications    predniSONE (Deltasone) 10 mg tablet    Yeast infection of the skin        Relevant Medications    nystatin (Mycostatin) 100,000 unit/gram powder          TEJINDER- she will resume CPAP when she receives the mask and after cough resolves   Post -Viral Cough -  add steroids. Can last another couple of weeks.   Yeast infx, skin-  med renewed.         Schedule Virtual appt in December for TEJINDER / CPAP followup   Keep appt  as scheduled in January     MASOUD Williamson MD

## 2023-10-19 NOTE — TELEPHONE ENCOUNTER
Spoke to Brandon, as long as the patient is compliant for 30 consecutive days during the first 90 days of starting CPAP she will be fine.  They do have documentation that she is waiting on a mask.

## 2023-10-25 ENCOUNTER — APPOINTMENT (OUTPATIENT)
Dept: PRIMARY CARE | Facility: CLINIC | Age: 73
End: 2023-10-25
Payer: MEDICARE

## 2023-10-26 DIAGNOSIS — J45.909 UNSPECIFIED ASTHMA, UNCOMPLICATED (HHS-HCC): ICD-10-CM

## 2023-10-26 RX ORDER — ALBUTEROL SULFATE 90 UG/1
AEROSOL, METERED RESPIRATORY (INHALATION)
Qty: 17 G | Refills: 5 | Status: SHIPPED | OUTPATIENT
Start: 2023-10-26

## 2023-12-01 ENCOUNTER — TELEMEDICINE (OUTPATIENT)
Dept: PRIMARY CARE | Facility: CLINIC | Age: 73
End: 2023-12-01
Payer: MEDICARE

## 2023-12-01 DIAGNOSIS — G47.33 OSA (OBSTRUCTIVE SLEEP APNEA): Primary | ICD-10-CM

## 2023-12-01 PROCEDURE — 99212 OFFICE O/P EST SF 10 MIN: CPT | Performed by: FAMILY MEDICINE

## 2023-12-01 NOTE — PROGRESS NOTES
Phone call         Subjective   Patient ID: Nam Bernardo is a 73 y.o. female who presents for OSAw  CPAP   HPI    Since last visit, has been wearing CPAP nightly.  Sometimes sleeps 4 hrs,  sometimes longer. Has noticed eye pain and facial swelling.       Physical Exam    Assessment/Plan   Problem List Items Addressed This Visit          Medium    TEJINDER (obstructive sleep apnea) - Primary        Consistent with cpap use and getting benefit.   Possibly eye and face sxs from it .  Recommend trial off the cpap and report back on sxs. \      Followup - as scheduled     MASOUD Williamson MD

## 2023-12-04 DIAGNOSIS — K21.9 GASTROESOPHAGEAL REFLUX DISEASE WITHOUT ESOPHAGITIS: ICD-10-CM

## 2023-12-04 DIAGNOSIS — E78.5 HYPERLIPIDEMIA, UNSPECIFIED HYPERLIPIDEMIA TYPE: ICD-10-CM

## 2023-12-04 DIAGNOSIS — E03.9 HYPOTHYROIDISM, UNSPECIFIED TYPE: ICD-10-CM

## 2023-12-05 RX ORDER — PRAVASTATIN SODIUM 10 MG/1
10 TABLET ORAL
Qty: 90 TABLET | Refills: 1 | Status: SHIPPED | OUTPATIENT
Start: 2023-12-05 | End: 2024-04-24 | Stop reason: SDUPTHER

## 2023-12-05 RX ORDER — PANTOPRAZOLE SODIUM 40 MG/1
40 TABLET, DELAYED RELEASE ORAL
Qty: 90 TABLET | Refills: 1 | Status: SHIPPED | OUTPATIENT
Start: 2023-12-05 | End: 2024-04-24 | Stop reason: SDUPTHER

## 2023-12-05 RX ORDER — LEVOTHYROXINE SODIUM 112 UG/1
112 TABLET ORAL
Qty: 90 TABLET | Refills: 1 | Status: SHIPPED | OUTPATIENT
Start: 2023-12-05 | End: 2024-04-24 | Stop reason: SDUPTHER

## 2023-12-08 DIAGNOSIS — F41.8 DEPRESSION WITH ANXIETY: ICD-10-CM

## 2023-12-08 RX ORDER — ESCITALOPRAM OXALATE 5 MG/1
5 TABLET ORAL
Qty: 90 TABLET | Refills: 2 | Status: SHIPPED | OUTPATIENT
Start: 2023-12-08 | End: 2024-04-24 | Stop reason: SDUPTHER

## 2024-01-13 DIAGNOSIS — J45.30 MILD PERSISTENT ASTHMA WITHOUT COMPLICATION (HHS-HCC): ICD-10-CM

## 2024-01-13 DIAGNOSIS — I10 PRIMARY HYPERTENSION: ICD-10-CM

## 2024-01-16 RX ORDER — BUDESONIDE AND FORMOTEROL FUMARATE DIHYDRATE 80; 4.5 UG/1; UG/1
AEROSOL RESPIRATORY (INHALATION)
Qty: 10.2 G | Refills: 2 | Status: SHIPPED | OUTPATIENT
Start: 2024-01-16 | End: 2024-06-07

## 2024-01-16 RX ORDER — LOSARTAN POTASSIUM 100 MG/1
100 TABLET ORAL DAILY
Qty: 90 TABLET | Refills: 2 | Status: SHIPPED | OUTPATIENT
Start: 2024-01-16

## 2024-01-16 RX ORDER — AMLODIPINE BESYLATE 10 MG/1
10 TABLET ORAL DAILY
Qty: 90 TABLET | Refills: 2 | Status: SHIPPED | OUTPATIENT
Start: 2024-01-16

## 2024-01-17 ENCOUNTER — TELEPHONE (OUTPATIENT)
Dept: PRIMARY CARE | Facility: CLINIC | Age: 74
End: 2024-01-17
Payer: MEDICARE

## 2024-01-17 DIAGNOSIS — J45.30 MILD PERSISTENT ASTHMA WITHOUT COMPLICATION (HHS-HCC): ICD-10-CM

## 2024-01-17 PROBLEM — D18.01 HEMANGIOMA OF SKIN AND SUBCUTANEOUS TISSUE: Status: ACTIVE | Noted: 2023-06-29

## 2024-01-17 PROBLEM — L81.4 OTHER MELANIN HYPERPIGMENTATION: Status: ACTIVE | Noted: 2023-06-29

## 2024-01-17 PROBLEM — E87.1 HYPONATREMIA: Status: ACTIVE | Noted: 2018-01-21

## 2024-01-17 PROBLEM — R21 RASH AND OTHER NONSPECIFIC SKIN ERUPTION: Status: ACTIVE | Noted: 2023-06-29

## 2024-01-17 PROBLEM — I10 HYPERTENSION: Status: ACTIVE | Noted: 2018-04-02

## 2024-01-19 DIAGNOSIS — J45.30 MILD PERSISTENT ASTHMA WITHOUT COMPLICATION (HHS-HCC): ICD-10-CM

## 2024-01-19 DIAGNOSIS — F41.8 OTHER SPECIFIED ANXIETY DISORDERS: ICD-10-CM

## 2024-01-19 RX ORDER — MONTELUKAST SODIUM 10 MG/1
10 TABLET ORAL
Qty: 90 TABLET | Refills: 2 | Status: SHIPPED | OUTPATIENT
Start: 2024-01-19

## 2024-01-19 RX ORDER — ESCITALOPRAM OXALATE 10 MG/1
10 TABLET ORAL DAILY
Qty: 90 TABLET | Refills: 1 | Status: SHIPPED | OUTPATIENT
Start: 2024-01-19 | End: 2024-01-24 | Stop reason: SDUPTHER

## 2024-01-24 ENCOUNTER — OFFICE VISIT (OUTPATIENT)
Dept: PRIMARY CARE | Facility: CLINIC | Age: 74
End: 2024-01-24
Payer: MEDICARE

## 2024-01-24 VITALS
BODY MASS INDEX: 40.41 KG/M2 | TEMPERATURE: 98.6 F | OXYGEN SATURATION: 95 % | DIASTOLIC BLOOD PRESSURE: 61 MMHG | HEART RATE: 68 BPM | SYSTOLIC BLOOD PRESSURE: 112 MMHG | WEIGHT: 228.1 LBS

## 2024-01-24 DIAGNOSIS — F41.8 OTHER SPECIFIED ANXIETY DISORDERS: ICD-10-CM

## 2024-01-24 DIAGNOSIS — G47.33 OSA (OBSTRUCTIVE SLEEP APNEA): ICD-10-CM

## 2024-01-24 DIAGNOSIS — E78.5 HYPERLIPIDEMIA, UNSPECIFIED HYPERLIPIDEMIA TYPE: ICD-10-CM

## 2024-01-24 DIAGNOSIS — E03.9 HYPOTHYROIDISM, UNSPECIFIED TYPE: ICD-10-CM

## 2024-01-24 PROCEDURE — 1125F AMNT PAIN NOTED PAIN PRSNT: CPT | Performed by: FAMILY MEDICINE

## 2024-01-24 PROCEDURE — 4010F ACE/ARB THERAPY RXD/TAKEN: CPT | Performed by: FAMILY MEDICINE

## 2024-01-24 PROCEDURE — 1036F TOBACCO NON-USER: CPT | Performed by: FAMILY MEDICINE

## 2024-01-24 PROCEDURE — 1157F ADVNC CARE PLAN IN RCRD: CPT | Performed by: FAMILY MEDICINE

## 2024-01-24 PROCEDURE — 1159F MED LIST DOCD IN RCRD: CPT | Performed by: FAMILY MEDICINE

## 2024-01-24 PROCEDURE — 3074F SYST BP LT 130 MM HG: CPT | Performed by: FAMILY MEDICINE

## 2024-01-24 PROCEDURE — 99214 OFFICE O/P EST MOD 30 MIN: CPT | Performed by: FAMILY MEDICINE

## 2024-01-24 PROCEDURE — 3078F DIAST BP <80 MM HG: CPT | Performed by: FAMILY MEDICINE

## 2024-01-24 PROCEDURE — 1160F RVW MEDS BY RX/DR IN RCRD: CPT | Performed by: FAMILY MEDICINE

## 2024-01-24 RX ORDER — ESCITALOPRAM OXALATE 10 MG/1
10 TABLET ORAL DAILY
Qty: 90 TABLET | Refills: 1 | Status: SHIPPED | OUTPATIENT
Start: 2024-01-24 | End: 2024-04-24 | Stop reason: SDUPTHER

## 2024-01-24 NOTE — PROGRESS NOTES
Subjective   Patient ID: Nam Bernardo is a 73 y.o. female who presents for Follow-up (Pt is breaking out on her chin. ).  HPI    Followup  , 6 months   Last visit :  Virtual Visit  12/1/2023 to review TEJINDER , CPAP use .   At that time was having some eye sxs.  Asked her to do a trial off.  States eye sxs were less without it. Has remained compliant withit. Wears it 6-8 hrs at a time     Had Prolia  in early Dec (every 6 mo)   Had visit with Neurology  in Dec (routine )     Review of Systems  Red bumps- left chin. Sometimes itchy .     Current Outpatient Medications   Medication Sig Dispense Refill    albuterol 90 mcg/actuation inhaler INHALE 1 TO 2 PUFFS EVERY 4 TO 6 HOURS AS NEEDED. 17 g 5    amLODIPine (Norvasc) 10 mg tablet Take 1 tablet (10 mg) by mouth once every day. 90 tablet 2    denosumab (Prolia) 60 mg/mL syringe Inject 1 mL (60 mg) under the skin.      ergocalciferol (Vitamin D-2) 50 MCG (2000 UT) capsule capsule Take 1 capsule (50 mcg) by mouth once every day.      escitalopram (Lexapro) 5 mg tablet Take 1 tablet (5 mg) by mouth once every day. Take one tablet by mouth daily with 10 mg Escitalopram for a total of 15 mg 90 tablet 2    fluticasone (Flonase) 50 mcg/actuation nasal spray USE 2 SPRAYS IN EACH NOSTRIL ONCE DAILY 16 g 5    hydroCHLOROthiazide (Microzide) 12.5 mg capsule TAKE 1 CAPSULE DAILY. 90 capsule 1    levothyroxine (Synthroid, Levoxyl) 112 mcg tablet Take 1 tablet (112 mcg) by mouth once every day. 90 tablet 1    loratadine (Claritin) 10 mg tablet Take by mouth.      losartan (Cozaar) 100 mg tablet Take 1 tablet (100 mg) by mouth once every day. 90 tablet 2    melatonin-pyridoxine HCl, B6, 5-10 mg tablet extended release Take 5-10 mg by mouth at bedtime.      montelukast (Singulair) 10 mg tablet Take 1 tablet (10 mg) by mouth once every day. 90 tablet 2    mycophenolate (Cellcept) 500 mg tablet Take 2 tablets (1,000 mg) by mouth 2 times a day.      nystatin (Mycostatin) 100,000 unit/gram  powder Apply 1 Application topically 2 times a day. 30 g 3    OneTouch Ultra Test strip Use to Check Blood Sugar 2 times a day 50 strip 2    pantoprazole (ProtoNix) 40 mg EC tablet Take 1 tablet (40 mg) by mouth once every day. 90 tablet 1    pravastatin (Pravachol) 10 mg tablet Take 1 tablet (10 mg) by mouth once every day. 90 tablet 1    pyridostigmine (Mestinon) 60 mg tablet Take 1 tablet (60 mg) by mouth 3 times a day.      Symbicort 80-4.5 mcg/actuation inhaler Inhale 2 puffs in the morning and 2 puffs before bedtime. Rinse mouth after use. 10.2 g 2    traZODone (Desyrel) 50 mg tablet Take 1 tablet (50 mg) by mouth once daily at bedtime. 90 tablet 3    triamcinolone (Kenalog) 0.1 % cream use 1 Application twice a day topically for 3-4 weeks as needed, then weekends only; repeat every few months for flares. Avoid face      valACYclovir (Valtrex) 1 gram tablet Take 1 tablet (1,000 mg) by mouth once every day.      escitalopram (Lexapro) 10 mg tablet Take 1 tablet (10 mg) by mouth once daily. 90 tablet 1     No current facility-administered medications for this visit.     Overall doing okay.  Need lexapro 10 mg renewed.  Holidays were a bit stressful , missed her spouse a lot .      Objective   /61 (BP Location: Right arm, Patient Position: Sitting, BP Cuff Size: Large adult)   Pulse 68   Temp 37 °C (98.6 °F) (Temporal)   Wt 103 kg (228 lb 1.6 oz)   SpO2 95%   BMI 40.41 kg/m²     Physical Exam  Vitals reviewed.   Constitutional:       General: She is not in acute distress.  Cardiovascular:      Rate and Rhythm: Normal rate and regular rhythm.      Heart sounds: Normal heart sounds.   Pulmonary:      Effort: Pulmonary effort is normal.      Breath sounds: No wheezing or rales.   Neurological:      General: No focal deficit present.      Mental Status: She is alert.     Skin: left lateral chin, adjacent to corner of mouth, irregular red patch. No vesicles      Assessment/Plan   Problem List Items  Addressed This Visit          Medium    Hyperlipemia    Hypothyroidism    TEJINDER (obstructive sleep apnea)     Other Visit Diagnoses       Other specified anxiety disorders        Relevant Medications    escitalopram (Lexapro) 10 mg tablet          Contact derm, left side of chin - topical steroid (has Kenalog rx)  .   Monitor , notify if not improving.     Anxiety   Med renewed    TEJINDER  Stable. Uses regularly and gets benefit from it.     Hypothyroid   Continue current regimen.     Hl  .stable. Labs already ordered, due prior to next visit     Follow up in April 2024 , WANG Williamson MD

## 2024-01-26 ENCOUNTER — TELEPHONE (OUTPATIENT)
Dept: PRIMARY CARE | Facility: CLINIC | Age: 74
End: 2024-01-26
Payer: MEDICARE

## 2024-01-26 DIAGNOSIS — L82.1 SEBORRHEIC KERATOSIS: Primary | ICD-10-CM

## 2024-01-26 DIAGNOSIS — R21 RASH AND OTHER NONSPECIFIC SKIN ERUPTION: ICD-10-CM

## 2024-01-26 RX ORDER — TRIAMCINOLONE ACETONIDE 1 MG/G
CREAM TOPICAL
Qty: 45 G | Refills: 1 | Status: SHIPPED | OUTPATIENT
Start: 2024-01-26

## 2024-01-26 NOTE — TELEPHONE ENCOUNTER
Patient is calling to get a refill on her kenalog cream, she said she told Dr. Williamson she may have some at home but does not and now needs a refill.      Please send to [facility] in [place], [place]

## 2024-02-26 ENCOUNTER — TELEPHONE (OUTPATIENT)
Dept: PRIMARY CARE | Facility: CLINIC | Age: 74
End: 2024-02-26
Payer: MEDICARE

## 2024-02-26 NOTE — TELEPHONE ENCOUNTER
Per pt, she's calling Brandon to make arrangements for the return.  She wants to know what the next step would be.  Please advise.

## 2024-02-26 NOTE — TELEPHONE ENCOUNTER
Does pt know who the vendor is  who supplies the machine  ?   If so, I recommend she call them to arrange a return of the product.     If she does not, let me know. We may be able to find this out on our end.

## 2024-02-26 NOTE — TELEPHONE ENCOUNTER
Pt called because she is not wearing her cpap machine at night. She states she is breaking out with a rash on her check and chin. She also noticed swelling under her eyes and burning. She doesn't want to get charged for the machine. She would like to know the what to do.

## 2024-02-28 NOTE — TELEPHONE ENCOUNTER
Recommend a visit to check the rash and discuss next steps.  Can do a virtual or in person  .     If she chooses Virtual, I could see her at noon or at  445 pm   Weds/ Thurs/ Fri this week    If she cannot do virtual,  then in person ,  Thursday  overbook  330 pm  .

## 2024-03-01 ENCOUNTER — OFFICE VISIT (OUTPATIENT)
Dept: PRIMARY CARE | Facility: CLINIC | Age: 74
End: 2024-03-01
Payer: MEDICARE

## 2024-03-01 VITALS
DIASTOLIC BLOOD PRESSURE: 71 MMHG | BODY MASS INDEX: 40.74 KG/M2 | HEART RATE: 74 BPM | SYSTOLIC BLOOD PRESSURE: 138 MMHG | WEIGHT: 230 LBS | OXYGEN SATURATION: 96 % | TEMPERATURE: 98.4 F

## 2024-03-01 DIAGNOSIS — L24.9 IRRITANT DERMATITIS: Primary | ICD-10-CM

## 2024-03-01 PROCEDURE — 1160F RVW MEDS BY RX/DR IN RCRD: CPT | Performed by: FAMILY MEDICINE

## 2024-03-01 PROCEDURE — 1157F ADVNC CARE PLAN IN RCRD: CPT | Performed by: FAMILY MEDICINE

## 2024-03-01 PROCEDURE — 3078F DIAST BP <80 MM HG: CPT | Performed by: FAMILY MEDICINE

## 2024-03-01 PROCEDURE — 3075F SYST BP GE 130 - 139MM HG: CPT | Performed by: FAMILY MEDICINE

## 2024-03-01 PROCEDURE — 1125F AMNT PAIN NOTED PAIN PRSNT: CPT | Performed by: FAMILY MEDICINE

## 2024-03-01 PROCEDURE — 1159F MED LIST DOCD IN RCRD: CPT | Performed by: FAMILY MEDICINE

## 2024-03-01 PROCEDURE — 4010F ACE/ARB THERAPY RXD/TAKEN: CPT | Performed by: FAMILY MEDICINE

## 2024-03-01 PROCEDURE — 1036F TOBACCO NON-USER: CPT | Performed by: FAMILY MEDICINE

## 2024-03-01 PROCEDURE — 99213 OFFICE O/P EST LOW 20 MIN: CPT | Performed by: FAMILY MEDICINE

## 2024-03-01 NOTE — PROGRESS NOTES
Subjective   Patient ID: Nam Bernardo is a 73 y.o. female who presents for Rash (Pt has rash on face that she almost has cleared up but she thinks its from the CPAP mask).  HPI    Review of Systemsasked to come in for eval of rash;  skin issues  .  States she has been dealing with the rash/ issues for a few weeks.  Has not used  it for 2 wks .    It helps her sleep  .        Switched to [place] location     Skin   -red , bumpy areas del left side of face near mouth , itchy .  Right face, under eye,  swollen,  blister like .   Not as noticeable today,  has been using topical steroid cream,  moisturizer .     Objective   /71 (BP Location: Left arm, Patient Position: Sitting, BP Cuff Size: Adult)   Pulse 74   Temp 36.9 °C (98.4 °F) (Temporal)   Wt 104 kg (230 lb)   SpO2 96%   BMI 40.74 kg/m²     Physical Exam    Alert. No distress      Erythema, lower portion of face, skin is sl dry .    Assessment/Plan   Problem List Items Addressed This Visit    None  Visit Diagnoses       Irritant dermatitis    -  Primary            Tremaine contact  [facility] [place], see if they can troubleshoot the mask issue before discontinuing it .   Spoke to Lydia at [facility],  she will order diff mask  , and contact pt when it arrives ( foam )     Notified pt.     Followup  w me: as scheduled.    MASOUD Williamson MD

## 2024-04-16 ENCOUNTER — LAB (OUTPATIENT)
Dept: LAB | Facility: LAB | Age: 74
End: 2024-04-16
Payer: MEDICARE

## 2024-04-16 DIAGNOSIS — E55.9 VITAMIN D DEFICIENCY: ICD-10-CM

## 2024-04-16 DIAGNOSIS — E11.9 CONTROLLED TYPE 2 DIABETES MELLITUS WITHOUT COMPLICATION, WITHOUT LONG-TERM CURRENT USE OF INSULIN (MULTI): ICD-10-CM

## 2024-04-16 DIAGNOSIS — G47.33 OSA (OBSTRUCTIVE SLEEP APNEA): ICD-10-CM

## 2024-04-16 DIAGNOSIS — E78.5 HYPERLIPIDEMIA, UNSPECIFIED HYPERLIPIDEMIA TYPE: ICD-10-CM

## 2024-04-16 DIAGNOSIS — E03.9 HYPOTHYROIDISM, UNSPECIFIED TYPE: ICD-10-CM

## 2024-04-16 LAB
BASOPHILS # BLD AUTO: 0.03 X10*3/UL (ref 0–0.1)
BASOPHILS NFR BLD AUTO: 0.5 %
EOSINOPHIL # BLD AUTO: 0.09 X10*3/UL (ref 0–0.4)
EOSINOPHIL NFR BLD AUTO: 1.5 %
ERYTHROCYTE [DISTWIDTH] IN BLOOD BY AUTOMATED COUNT: 14.3 % (ref 11.5–14.5)
EST. AVERAGE GLUCOSE BLD GHB EST-MCNC: 120 MG/DL
HBA1C MFR BLD: 5.8 %
HCT VFR BLD AUTO: 40.3 % (ref 36–46)
HGB BLD-MCNC: 12.9 G/DL (ref 12–16)
IMM GRANULOCYTES # BLD AUTO: 0.02 X10*3/UL (ref 0–0.5)
IMM GRANULOCYTES NFR BLD AUTO: 0.3 % (ref 0–0.9)
LYMPHOCYTES # BLD AUTO: 1.98 X10*3/UL (ref 0.8–3)
LYMPHOCYTES NFR BLD AUTO: 32.8 %
MCH RBC QN AUTO: 29.1 PG (ref 26–34)
MCHC RBC AUTO-ENTMCNC: 32 G/DL (ref 32–36)
MCV RBC AUTO: 91 FL (ref 80–100)
MONOCYTES # BLD AUTO: 0.42 X10*3/UL (ref 0.05–0.8)
MONOCYTES NFR BLD AUTO: 7 %
NEUTROPHILS # BLD AUTO: 3.49 X10*3/UL (ref 1.6–5.5)
NEUTROPHILS NFR BLD AUTO: 57.9 %
NRBC BLD-RTO: 0 /100 WBCS (ref 0–0)
PLATELET # BLD AUTO: 300 X10*3/UL (ref 150–450)
RBC # BLD AUTO: 4.43 X10*6/UL (ref 4–5.2)
WBC # BLD AUTO: 6 X10*3/UL (ref 4.4–11.3)

## 2024-04-16 PROCEDURE — 82306 VITAMIN D 25 HYDROXY: CPT

## 2024-04-16 PROCEDURE — 85025 COMPLETE CBC W/AUTO DIFF WBC: CPT

## 2024-04-16 PROCEDURE — 84443 ASSAY THYROID STIM HORMONE: CPT

## 2024-04-16 PROCEDURE — 80053 COMPREHEN METABOLIC PANEL: CPT

## 2024-04-16 PROCEDURE — 80061 LIPID PANEL: CPT

## 2024-04-16 PROCEDURE — 83036 HEMOGLOBIN GLYCOSYLATED A1C: CPT

## 2024-04-16 PROCEDURE — 36415 COLL VENOUS BLD VENIPUNCTURE: CPT

## 2024-04-17 LAB
25(OH)D3 SERPL-MCNC: 47 NG/ML (ref 30–100)
ALBUMIN SERPL BCP-MCNC: 4.5 G/DL (ref 3.4–5)
ALP SERPL-CCNC: 54 U/L (ref 33–136)
ALT SERPL W P-5'-P-CCNC: 13 U/L (ref 7–45)
ANION GAP SERPL CALC-SCNC: 17 MMOL/L (ref 10–20)
AST SERPL W P-5'-P-CCNC: 18 U/L (ref 9–39)
BILIRUB SERPL-MCNC: 0.6 MG/DL (ref 0–1.2)
BUN SERPL-MCNC: 19 MG/DL (ref 6–23)
CALCIUM SERPL-MCNC: 10 MG/DL (ref 8.6–10.6)
CHLORIDE SERPL-SCNC: 100 MMOL/L (ref 98–107)
CHOLEST SERPL-MCNC: 186 MG/DL (ref 0–199)
CHOLESTEROL/HDL RATIO: 2.8
CO2 SERPL-SCNC: 26 MMOL/L (ref 21–32)
CREAT SERPL-MCNC: 0.66 MG/DL (ref 0.5–1.05)
EGFRCR SERPLBLD CKD-EPI 2021: >90 ML/MIN/1.73M*2
GLUCOSE SERPL-MCNC: 93 MG/DL (ref 74–99)
HDLC SERPL-MCNC: 66.1 MG/DL
LDLC SERPL CALC-MCNC: 98 MG/DL
NON HDL CHOLESTEROL: 120 MG/DL (ref 0–149)
POTASSIUM SERPL-SCNC: 4.6 MMOL/L (ref 3.5–5.3)
PROT SERPL-MCNC: 6.9 G/DL (ref 6.4–8.2)
SODIUM SERPL-SCNC: 138 MMOL/L (ref 136–145)
TRIGL SERPL-MCNC: 109 MG/DL (ref 0–149)
TSH SERPL-ACNC: 0.98 MIU/L (ref 0.44–3.98)
VLDL: 22 MG/DL (ref 0–40)

## 2024-04-24 ENCOUNTER — OFFICE VISIT (OUTPATIENT)
Dept: PRIMARY CARE | Facility: CLINIC | Age: 74
End: 2024-04-24
Payer: MEDICARE

## 2024-04-24 VITALS
HEIGHT: 63 IN | HEART RATE: 82 BPM | DIASTOLIC BLOOD PRESSURE: 66 MMHG | TEMPERATURE: 99.1 F | OXYGEN SATURATION: 93 % | WEIGHT: 229.6 LBS | BODY MASS INDEX: 40.68 KG/M2 | SYSTOLIC BLOOD PRESSURE: 115 MMHG

## 2024-04-24 DIAGNOSIS — E78.5 HYPERLIPIDEMIA, UNSPECIFIED HYPERLIPIDEMIA TYPE: ICD-10-CM

## 2024-04-24 DIAGNOSIS — K21.9 GASTROESOPHAGEAL REFLUX DISEASE WITHOUT ESOPHAGITIS: ICD-10-CM

## 2024-04-24 DIAGNOSIS — L30.9 DERMATITIS: ICD-10-CM

## 2024-04-24 DIAGNOSIS — F41.8 OTHER SPECIFIED ANXIETY DISORDERS: ICD-10-CM

## 2024-04-24 DIAGNOSIS — Z00.00 ROUTINE GENERAL MEDICAL EXAMINATION AT HEALTH CARE FACILITY: Primary | ICD-10-CM

## 2024-04-24 DIAGNOSIS — Z11.59 NEED FOR HEPATITIS C SCREENING TEST: ICD-10-CM

## 2024-04-24 DIAGNOSIS — I10 ESSENTIAL (PRIMARY) HYPERTENSION: ICD-10-CM

## 2024-04-24 DIAGNOSIS — E11.9 CONTROLLED TYPE 2 DIABETES MELLITUS WITHOUT COMPLICATION, WITHOUT LONG-TERM CURRENT USE OF INSULIN (MULTI): ICD-10-CM

## 2024-04-24 DIAGNOSIS — E03.9 HYPOTHYROIDISM, UNSPECIFIED TYPE: ICD-10-CM

## 2024-04-24 DIAGNOSIS — F41.8 DEPRESSION WITH ANXIETY: ICD-10-CM

## 2024-04-24 PROCEDURE — 1159F MED LIST DOCD IN RCRD: CPT | Performed by: FAMILY MEDICINE

## 2024-04-24 PROCEDURE — 1170F FXNL STATUS ASSESSED: CPT | Performed by: FAMILY MEDICINE

## 2024-04-24 PROCEDURE — 1036F TOBACCO NON-USER: CPT | Performed by: FAMILY MEDICINE

## 2024-04-24 PROCEDURE — 1157F ADVNC CARE PLAN IN RCRD: CPT | Performed by: FAMILY MEDICINE

## 2024-04-24 PROCEDURE — 1123F ACP DISCUSS/DSCN MKR DOCD: CPT | Performed by: FAMILY MEDICINE

## 2024-04-24 PROCEDURE — 3048F LDL-C <100 MG/DL: CPT | Performed by: FAMILY MEDICINE

## 2024-04-24 PROCEDURE — G0439 PPPS, SUBSEQ VISIT: HCPCS | Performed by: FAMILY MEDICINE

## 2024-04-24 PROCEDURE — 3074F SYST BP LT 130 MM HG: CPT | Performed by: FAMILY MEDICINE

## 2024-04-24 PROCEDURE — 99213 OFFICE O/P EST LOW 20 MIN: CPT | Performed by: FAMILY MEDICINE

## 2024-04-24 PROCEDURE — 1160F RVW MEDS BY RX/DR IN RCRD: CPT | Performed by: FAMILY MEDICINE

## 2024-04-24 PROCEDURE — 3078F DIAST BP <80 MM HG: CPT | Performed by: FAMILY MEDICINE

## 2024-04-24 PROCEDURE — 3044F HG A1C LEVEL LT 7.0%: CPT | Performed by: FAMILY MEDICINE

## 2024-04-24 PROCEDURE — 4010F ACE/ARB THERAPY RXD/TAKEN: CPT | Performed by: FAMILY MEDICINE

## 2024-04-24 RX ORDER — PANTOPRAZOLE SODIUM 40 MG/1
40 TABLET, DELAYED RELEASE ORAL
Qty: 90 TABLET | Refills: 1 | Status: SHIPPED | OUTPATIENT
Start: 2024-04-24 | End: 2024-06-04

## 2024-04-24 RX ORDER — NYSTATIN AND TRIAMCINOLONE ACETONIDE 100000; 1 [USP'U]/G; MG/G
OINTMENT TOPICAL 2 TIMES DAILY
Qty: 30 G | Refills: 0 | Status: SHIPPED | OUTPATIENT
Start: 2024-04-24

## 2024-04-24 RX ORDER — PRAVASTATIN SODIUM 10 MG/1
10 TABLET ORAL
Qty: 90 TABLET | Refills: 1 | Status: SHIPPED | OUTPATIENT
Start: 2024-04-24

## 2024-04-24 RX ORDER — LEVOTHYROXINE SODIUM 112 UG/1
112 TABLET ORAL
Qty: 90 TABLET | Refills: 1 | Status: SHIPPED | OUTPATIENT
Start: 2024-04-24

## 2024-04-24 RX ORDER — CHOLECALCIFEROL (VITAMIN D3) 50 MCG
50 TABLET ORAL DAILY
COMMUNITY

## 2024-04-24 RX ORDER — ESCITALOPRAM OXALATE 5 MG/1
5 TABLET ORAL
Qty: 90 TABLET | Refills: 1 | Status: SHIPPED | OUTPATIENT
Start: 2024-04-24

## 2024-04-24 RX ORDER — ESCITALOPRAM OXALATE 10 MG/1
10 TABLET ORAL DAILY
Qty: 90 TABLET | Refills: 1 | Status: SHIPPED | OUTPATIENT
Start: 2024-04-24 | End: 2024-10-21

## 2024-04-24 RX ORDER — HYDROCHLOROTHIAZIDE 12.5 MG/1
12.5 CAPSULE ORAL DAILY
Qty: 90 CAPSULE | Refills: 1 | Status: SHIPPED | OUTPATIENT
Start: 2024-04-24

## 2024-04-24 ASSESSMENT — ACTIVITIES OF DAILY LIVING (ADL)
TAKING_MEDICATION: INDEPENDENT
MANAGING_FINANCES: INDEPENDENT
GROCERY_SHOPPING: INDEPENDENT
DRESSING: INDEPENDENT
DOING_HOUSEWORK: INDEPENDENT
BATHING: INDEPENDENT

## 2024-04-24 ASSESSMENT — PATIENT HEALTH QUESTIONNAIRE - PHQ9
1. LITTLE INTEREST OR PLEASURE IN DOING THINGS: NOT AT ALL
SUM OF ALL RESPONSES TO PHQ9 QUESTIONS 1 AND 2: 0
2. FEELING DOWN, DEPRESSED OR HOPELESS: NOT AT ALL

## 2024-04-24 NOTE — PROGRESS NOTES
"Subjective   Reason for Visit: Nam Bernardo is an 73 y.o. female here for a Medicare Wellness visit.          Reviewed all medications by prescribing practitioner or clinical pharmacist (such as prescriptions, OTCs, herbal therapies and supplements) and documented in the medical record.    HPI    Pt here for wellness .  Last CPE 1 year ago .       Interval Health :     Saw Me for rash   ;  rec a different mask ( foam )  ; still has rash .  Awaiting Derm appt. Using steroid crm, didn't make much difference.  Has not worn mask for several days and continues to have itch, redness.     Had Prolia  in early Dec (every 6 mo)   Had visit with Neurology  in Dec (routine check of  Myasthenia Gravis  )     Interval Changes in PMHx. PSHx, FMHx : none     Concerns/Questions:        Skin Rash , Facial - see above   Stays congested, all year round , sometimes pain  . Using allergy med. Doesn't seem to do much / change much   Stays sob with exercise. No cp .  Has inhaler she uses.    Used to walk more regularly when  alive (a couple yrs since he passed)     PROB LIST   TEJINDER  on CPAP  . Not able to tolerate mask  , unfortunately   HL on statin   Hypothyroid  stable on thyroid med  MG  followed by specialist ; quiet recently , triggered by stress; pt is aware, works to not get upset/ stay stressed   Oporosis  followed by specialist (Dr. Alba) . Had DEXA recently  at his office   Anxiety on SSRI  . Insomnia -on Trazodone   HTN on medication     Patient Care Team:  Leah Williamson MD as PCP - General  Ana Lilia Griffin MD as PCP - tna Medicare Advantage PCP     Review of Systems    Objective   Vitals:  /66 (BP Location: Left arm, Patient Position: Sitting, BP Cuff Size: Large adult)   Pulse 82   Temp 37.3 °C (99.1 °F) (Temporal)   Ht 1.6 m (5' 3\")   Wt 104 kg (229 lb 9.6 oz)   SpO2 93%   BMI 40.67 kg/m²       Physical Exam  Vitals reviewed.   Constitutional:       General: She is not in acute " distress.  HENT:      Right Ear: Tympanic membrane normal.      Left Ear: Tympanic membrane normal.   Eyes:      Extraocular Movements: Extraocular movements intact.      Conjunctiva/sclera: Conjunctivae normal.      Pupils: Pupils are equal, round, and reactive to light.   Neck:      Vascular: No carotid bruit.   Cardiovascular:      Rate and Rhythm: Normal rate and regular rhythm.      Heart sounds: Normal heart sounds.   Pulmonary:      Effort: Pulmonary effort is normal.      Breath sounds: No wheezing or rales.   Abdominal:      General: Abdomen is flat. Bowel sounds are normal.      Palpations: Abdomen is soft.   Musculoskeletal:      Cervical back: No rigidity.   Lymphadenopathy:      Cervical: No cervical adenopathy.   Skin:     Coloration: Skin is not jaundiced.      Findings: Rash present.   Neurological:      General: No focal deficit present.      Mental Status: She is alert.   Psychiatric:         Mood and Affect: Mood normal.         Thought Content: Thought content normal.         Judgment: Judgment normal.       Macular eruption, nasolabial folds, corners of mouth , and maculopapules scattered on chin   Assessment/Plan   Problem List Items Addressed This Visit          Medium    Controlled type 2 diabetes mellitus without complication, without long-term current use of insulin (Multi)    Relevant Orders    Follow Up In [facility] - PCP - Established    Hemoglobin A1C    Albumin , Urine Random    Depression with anxiety    Relevant Medications    escitalopram (Lexapro) 5 mg tablet    GERD (gastroesophageal reflux disease)    Relevant Medications    pantoprazole (ProtoNix) 40 mg EC tablet    Hyperlipemia    Relevant Medications    pravastatin (Pravachol) 10 mg tablet    Hypothyroidism    Relevant Medications    levothyroxine (Synthroid, Levoxyl) 112 mcg tablet     Other Visit Diagnoses       Routine general medical examination at health care facility    -  Primary    Other specified anxiety  disorders        Relevant Medications    escitalopram (Lexapro) 10 mg tablet    Essential (primary) hypertension        Relevant Medications    hydroCHLOROthiazide (Microzide) 12.5 mg capsule    Need for hepatitis C screening test        Relevant Orders    Hepatitis C Antibody    Dermatitis        Relevant Medications    nystatin-triamcinolone (Mycolog II) ointment            Wellness  - preventive care and health maintenance reviewed and discussed   Diabetes mellitus 2  -Well-controlled  -Encouraged exercise, regularly.  This will help her shortness of breath    Dermatitis , from mask wearing  - trial on topical yeast med;   - keep Derm appt as planned . Ok to keep mask off/ not use CPAP     Hypertension, hyperlipidemia, GERD, hypothyroidism  -Continue current medications these issues are stable    Osteoporosis  -Continue to follow-up with specialist    Anxiety, insomnia  -No change to medications today.  Encouraged that she continue her current coping skills.  Discussed areas of stress, offered support    Overall, patient's chronic conditions are stable.  Her vaccines are current.  Her cancer screenings are current.  I refilled medications that she indicated were in need of refills.  Will plan to see her in 6 months.  She is to do blood work and urine testing prior to that visit    Follow-up 6 months

## 2024-04-24 NOTE — PATIENT INSTRUCTIONS
Frankicelsa/ Nam,   Today we did your check up/ annual Wellness .  Medications were refilled for Thyroid,  Reflux,  Anxiety , Blood Pressure     Recent labwork shows excellent control of sugars.      Immunizations : up to date  Cancer screening:  Breast ,  Mammogram due in 6 months  ; Colon ,  Colonoscopy is up to date     Bone Density :  not due at this time.     Next appt  :  plan for 6 months for a followup

## 2024-05-29 ENCOUNTER — OFFICE VISIT (OUTPATIENT)
Dept: DERMATOLOGY | Facility: CLINIC | Age: 74
End: 2024-05-29
Payer: MEDICARE

## 2024-05-29 DIAGNOSIS — L21.9 SEBORRHEIC DERMATITIS: Primary | ICD-10-CM

## 2024-05-29 PROCEDURE — 1160F RVW MEDS BY RX/DR IN RCRD: CPT | Performed by: NURSE PRACTITIONER

## 2024-05-29 PROCEDURE — 1159F MED LIST DOCD IN RCRD: CPT | Performed by: NURSE PRACTITIONER

## 2024-05-29 PROCEDURE — 99213 OFFICE O/P EST LOW 20 MIN: CPT | Performed by: NURSE PRACTITIONER

## 2024-05-29 PROCEDURE — 4010F ACE/ARB THERAPY RXD/TAKEN: CPT | Performed by: NURSE PRACTITIONER

## 2024-05-29 PROCEDURE — 3044F HG A1C LEVEL LT 7.0%: CPT | Performed by: NURSE PRACTITIONER

## 2024-05-29 PROCEDURE — 3048F LDL-C <100 MG/DL: CPT | Performed by: NURSE PRACTITIONER

## 2024-05-29 PROCEDURE — 1036F TOBACCO NON-USER: CPT | Performed by: NURSE PRACTITIONER

## 2024-05-29 PROCEDURE — 1157F ADVNC CARE PLAN IN RCRD: CPT | Performed by: NURSE PRACTITIONER

## 2024-05-29 PROCEDURE — 1123F ACP DISCUSS/DSCN MKR DOCD: CPT | Performed by: NURSE PRACTITIONER

## 2024-05-29 RX ORDER — KETOCONAZOLE 20 MG/G
CREAM TOPICAL
Qty: 30 G | Refills: 5 | Status: SHIPPED | OUTPATIENT
Start: 2024-05-29

## 2024-05-29 RX ORDER — KETOCONAZOLE 20 MG/G
CREAM TOPICAL
COMMUNITY
Start: 2024-05-10 | End: 2024-05-29 | Stop reason: ALTCHOICE

## 2024-05-29 NOTE — PROGRESS NOTES
"Subjective     Nam Bernardo is a 73 y.o. female who presents for the following: Rash (Around nose and mouth- pt states it started prior to Nathan. Pt was using triamcinolone 0.1% and nystatin/triamcinolone ointment. Pt endorses itching and pain. ).     Review of Systems:  No other skin or systemic complaints other than what is documented elsewhere in the note.    The following portions of the chart were reviewed this encounter and updated as appropriate:   Tobacco  Allergies  Meds  Problems  Med Hx  Surg Hx  Fam Hx         Skin Cancer History  No skin cancer on file.      Specialty Problems          Dermatology Problems    Seborrheic keratosis    Hemangioma of skin and subcutaneous tissue    Other melanin hyperpigmentation    Rash and other nonspecific skin eruption        Objective   Well appearing patient in no apparent distress; mood and affect are within normal limits.    A focused skin examination was performed. All findings within normal limits unless otherwise noted below.    Assessment/Plan   1. Seborrheic dermatitis  Head - Anterior (Face), Left Alar Crease, Right Alar Crease  Erythema with overlying greasy scale.    -Discussed the nature of the diagnosis  -There is no \"cure\" for this condition. Treatments will need to be continued long term to treat the condition  -Recommend: Discontinue triamcinolone for now. Start ketoconazole cream, use as directed.   - Risks, benefits, and side effects discussed. Patient understood and agrees with the plan.             "

## 2024-06-01 DIAGNOSIS — F51.01 PRIMARY INSOMNIA: ICD-10-CM

## 2024-06-01 DIAGNOSIS — K21.9 GASTROESOPHAGEAL REFLUX DISEASE WITHOUT ESOPHAGITIS: ICD-10-CM

## 2024-06-01 DIAGNOSIS — F41.8 DEPRESSION WITH ANXIETY: ICD-10-CM

## 2024-06-04 RX ORDER — PANTOPRAZOLE SODIUM 40 MG/1
40 TABLET, DELAYED RELEASE ORAL
Qty: 90 TABLET | Refills: 1 | Status: SHIPPED | OUTPATIENT
Start: 2024-06-04

## 2024-06-04 RX ORDER — TRAZODONE HYDROCHLORIDE 50 MG/1
50 TABLET ORAL NIGHTLY
Qty: 90 TABLET | Refills: 1 | Status: SHIPPED | OUTPATIENT
Start: 2024-06-04

## 2024-06-06 DIAGNOSIS — B00.9 HERPESVIRAL INFECTION, UNSPECIFIED: ICD-10-CM

## 2024-06-06 RX ORDER — VALACYCLOVIR HYDROCHLORIDE 1 G/1
1000 TABLET, FILM COATED ORAL DAILY
Qty: 90 TABLET | Refills: 1 | Status: SHIPPED | OUTPATIENT
Start: 2024-06-06 | End: 2024-12-03

## 2024-06-07 DIAGNOSIS — J45.30 MILD PERSISTENT ASTHMA WITHOUT COMPLICATION (HHS-HCC): ICD-10-CM

## 2024-06-07 RX ORDER — BUDESONIDE AND FORMOTEROL FUMARATE DIHYDRATE 80; 4.5 UG/1; UG/1
2 AEROSOL RESPIRATORY (INHALATION)
Qty: 10.2 G | Refills: 2 | Status: SHIPPED | OUTPATIENT
Start: 2024-06-07 | End: 2025-06-07

## 2024-07-01 ENCOUNTER — APPOINTMENT (OUTPATIENT)
Dept: DERMATOLOGY | Facility: CLINIC | Age: 74
End: 2024-07-01
Payer: MEDICARE

## 2024-07-01 DIAGNOSIS — D48.5 NEOPLASM OF UNCERTAIN BEHAVIOR OF SKIN: Primary | ICD-10-CM

## 2024-07-01 DIAGNOSIS — L81.4 LENTIGO: ICD-10-CM

## 2024-07-01 DIAGNOSIS — D18.01 HEMANGIOMA OF SKIN: ICD-10-CM

## 2024-07-01 DIAGNOSIS — Z12.83 ENCOUNTER FOR SCREENING FOR MALIGNANT NEOPLASM OF SKIN: ICD-10-CM

## 2024-07-01 DIAGNOSIS — L82.1 SEBORRHEIC KERATOSIS: ICD-10-CM

## 2024-07-01 PROCEDURE — 1123F ACP DISCUSS/DSCN MKR DOCD: CPT | Performed by: NURSE PRACTITIONER

## 2024-07-01 PROCEDURE — 1159F MED LIST DOCD IN RCRD: CPT | Performed by: NURSE PRACTITIONER

## 2024-07-01 PROCEDURE — 1036F TOBACCO NON-USER: CPT | Performed by: NURSE PRACTITIONER

## 2024-07-01 PROCEDURE — 3048F LDL-C <100 MG/DL: CPT | Performed by: NURSE PRACTITIONER

## 2024-07-01 PROCEDURE — 3044F HG A1C LEVEL LT 7.0%: CPT | Performed by: NURSE PRACTITIONER

## 2024-07-01 PROCEDURE — 99213 OFFICE O/P EST LOW 20 MIN: CPT | Performed by: NURSE PRACTITIONER

## 2024-07-01 PROCEDURE — 1157F ADVNC CARE PLAN IN RCRD: CPT | Performed by: NURSE PRACTITIONER

## 2024-07-01 PROCEDURE — 11102 TANGNTL BX SKIN SINGLE LES: CPT | Performed by: NURSE PRACTITIONER

## 2024-07-01 PROCEDURE — 4010F ACE/ARB THERAPY RXD/TAKEN: CPT | Performed by: NURSE PRACTITIONER

## 2024-07-01 NOTE — PROGRESS NOTES
Subjective   Nam Bernardo is a 73 y.o. female who presents for the following: Skin Check.  Skin Cancer Screening  She has a history of moderate sun exposure. She is in the sun occasionally. She uses sunscreen never. She reports no skin symptoms. Her moles are not changing.    Spots that concern her:         Objective   Well appearing patient in no apparent distress; mood and affect are within normal limits.    A full examination was performed including scalp, head, eyes, ears, nose, lips, neck, chest, axillae, abdomen, back, buttocks, bilateral upper extremities, bilateral lower extremities, hands, feet, fingers, toes, fingernails, and toenails. All findings within normal limits unless otherwise noted below.    Scattered benign lesions    Stuck on verrucous, tan-brown papules and plaques.      Violaceous/red papule with maroon lagoons     Scattered tan macules in sun-exposed areas.    Left Breast  5mm red smooth papule            Assessment/Plan   Neoplasm of uncertain behavior of skin  Left Breast    Lesion biopsy  Type of biopsy: tangential    Informed consent: discussed and consent obtained    Timeout: patient name, date of birth, surgical site, and procedure verified    Procedure prep:  Patient was prepped and draped  Anesthesia: the lesion was anesthetized in a standard fashion    Anesthetic:  1% lidocaine w/ epinephrine 1-100,000 local infiltration  Instrument used: DermaBlade    Hemostasis achieved with: aluminum chloride    Outcome: patient tolerated procedure well    Post-procedure details: sterile dressing applied and wound care instructions given    Dressing type: petrolatum and bandage      Specimen 1 - Dermatopathology- DERM LAB  Differential Diagnosis: angioma vs other  Check Margins Yes/No?:    Comments:    Dermpath Lab: Routine Histopathology (formalin-fixed tissue)    -  Discussed differential with patient.   - Given uncertainty of clinical diagnosis, a biopsy is recommended in clinic today.   -  The patient expressed understanding, is in agreement with this plan, and wishes to proceed with biopsy.   - Oral and written wound care instructions provided.   - Advised the patient that the office will call within 2 weeks to discuss biopsy results.     Encounter for screening for malignant neoplasm of skin    - Protective measures, such as avoiding skin exposure to sunlight during peak sun hours (10 AM to 3 PM), wearing protective clothing, and applying high-SPF sunscreen, are essential for reducing exposure to harmful ultraviolet (UV) light.  - Monthly self-examination of the skin is helpful to detect new lesions or changes in existing lesions.  - Discussed signs and symptoms of sun-related skin cancers.   - Make sure your moles are not signs of skin cancer (melanoma). Remember the ABCDEs of melanoma lesions:  A - Asymmetry: One half of the lesion does not mirror the other half.  B - Border: The borders are irregular or vague (indistinct).  C - Color: More than one color may be noted within the mole.  D - Diameter: Size greater than 6 mm (roughly the size of a pencil eraser) may be concerning.  E - Evolving: Notable changes in the lesion over time are suspicious signs for skin cancer.    Seborrheic keratosis    Although Seborrheic Keratoses can be troublesome and unsightly, they are entirely benign.  Removal of Seborrheic Keratoses is considered a cosmetic procedure. Removal is typically performed using liquid nitrogen cryotherapy.  Treatment of current lesions does not prevent the development of new Seborrheic Keratoses in the future.    Hemangioma of skin    - A cherry hemangioma is a small macule (small, flat, smooth area) or papule (small, solid bump) formed from an overgrowth of tiny blood vessels in the skin. Cherry hemangiomas are characteristically red or purplish in color. They often first appear in middle adulthood and usually increase in number with age. Cherry hemangiomas are noncancerous (benign) and  are common in adults.  - Lesions are benign, reassured patient.     Lentigo    A solar lentigo (plural, solar lentigines), sometimes called an age spot or liver spot, is a brown macule (small, flat, smooth area of skin) caused by chronic sun or artificial ultraviolet (UV) light exposure. There may be just one lentigo or there may be multiple. This type of lentigo is different from lentigo simplex (discussed separately) because it is caused by exposure to UV light. Solar lentigines are benign, but they do indicate excessive sun exposure, a risk factor for the development of skin cancer.  Lesions are benign, no treatment needed.     Follow up in 12 months for a total body skin exam. Please call me if there are any changes or development of concerning symptoms (lesion/skin condition is changing, bleeding, enlarging, or worsening).

## 2024-07-02 DIAGNOSIS — F41.8 DEPRESSION WITH ANXIETY: ICD-10-CM

## 2024-07-03 DIAGNOSIS — L30.9 DERMATITIS: ICD-10-CM

## 2024-07-03 LAB
LABORATORY COMMENT REPORT: NORMAL
PATH REPORT.FINAL DX SPEC: NORMAL
PATH REPORT.GROSS SPEC: NORMAL
PATH REPORT.MICROSCOPIC SPEC OTHER STN: NORMAL
PATH REPORT.RELEVANT HX SPEC: NORMAL
PATH REPORT.TOTAL CANCER: NORMAL

## 2024-07-03 RX ORDER — HYDROCORTISONE 25 MG/G
CREAM TOPICAL 2 TIMES DAILY
Qty: 28 G | Refills: 3 | Status: SHIPPED | OUTPATIENT
Start: 2024-07-03 | End: 2024-09-01

## 2024-07-03 RX ORDER — HYDROCORTISONE 25 MG/G
CREAM TOPICAL 2 TIMES DAILY
COMMUNITY
End: 2024-07-03 | Stop reason: SDUPTHER

## 2024-07-03 RX ORDER — ESCITALOPRAM OXALATE 5 MG/1
5 TABLET ORAL
Qty: 90 TABLET | Refills: 2 | Status: SHIPPED | OUTPATIENT
Start: 2024-07-03

## 2024-07-03 NOTE — TELEPHONE ENCOUNTER
Called for hydrocortisone fill, forwarded to provider. Returned patient's call, VM left detailing status of RX fill.

## 2024-07-22 ENCOUNTER — TELEPHONE (OUTPATIENT)
Dept: PRIMARY CARE | Facility: CLINIC | Age: 74
End: 2024-07-22

## 2024-07-22 NOTE — TELEPHONE ENCOUNTER
Pt tested positive for covid on: 07/22/24     Symptoms started: 07/22/24    Symptoms include: temp 99.7, cough, sneezing,     Fully vaccinated: Yes     Pt does have Myasthenia gravis     Reviewed CDC Guidelines and supportive care instructions but told pt we would call back if doctor feel any other treatment would be recommended based on their medical history.  Please advise.

## 2024-07-22 NOTE — TELEPHONE ENCOUNTER
Pt would not be a candidate for paxlovid due to multiple med interactions.     For symptomatic treatment:     If secretions are THICK:   Drink at least 6-8 glasses of water daily to thin secretions.    Mucinex can be used if secretions remain thick.    If secretions are THIN, watery, and abundant:  Antihistamine such as loratadine (claritin) can help dry up a drippy nose.      Supplementation of 75 mg of zinc at first sign of cold-like symptoms has been shown to improve symptoms.     A teaspoon of honey every 4 hours as needed for cough has been shown to reduce cough as well or better than over-the-counter cough suppressants.  Cough drops can also be helpful.     Gargling with warm salt water can help clear post nasal drip and soothe a sore throat.  Throat lozenges can also be helpful.      Fever or aches can be helped by taking acetaminophen (Tylenol) every four hours as needed, or ibuprofen (Motrin, Advil) or naproxen (Aleve) as directed if you are able.     If develops sob/difficulty breathing to ER

## 2024-08-30 DIAGNOSIS — E03.9 HYPOTHYROIDISM, UNSPECIFIED TYPE: ICD-10-CM

## 2024-08-30 RX ORDER — LEVOTHYROXINE SODIUM 112 UG/1
112 TABLET ORAL
Qty: 90 TABLET | Refills: 1 | Status: SHIPPED | OUTPATIENT
Start: 2024-08-30

## 2024-10-05 DIAGNOSIS — B37.2 YEAST INFECTION OF THE SKIN: ICD-10-CM

## 2024-10-07 RX ORDER — NYSTATIN 100000 [USP'U]/G
1 POWDER TOPICAL 2 TIMES DAILY
Qty: 30 G | Refills: 3 | Status: SHIPPED | OUTPATIENT
Start: 2024-10-07

## 2024-10-24 ENCOUNTER — LAB (OUTPATIENT)
Dept: LAB | Facility: LAB | Age: 74
End: 2024-10-24
Payer: MEDICARE

## 2024-10-24 ENCOUNTER — APPOINTMENT (OUTPATIENT)
Dept: PRIMARY CARE | Facility: CLINIC | Age: 74
End: 2024-10-24
Payer: MEDICARE

## 2024-10-24 VITALS
SYSTOLIC BLOOD PRESSURE: 119 MMHG | BODY MASS INDEX: 39.63 KG/M2 | HEART RATE: 69 BPM | TEMPERATURE: 98.8 F | WEIGHT: 223.7 LBS | DIASTOLIC BLOOD PRESSURE: 67 MMHG | OXYGEN SATURATION: 95 % | RESPIRATION RATE: 14 BRPM

## 2024-10-24 DIAGNOSIS — I10 BENIGN ESSENTIAL HYPERTENSION: Primary | ICD-10-CM

## 2024-10-24 DIAGNOSIS — E78.5 HYPERLIPIDEMIA, UNSPECIFIED HYPERLIPIDEMIA TYPE: ICD-10-CM

## 2024-10-24 DIAGNOSIS — F41.8 OTHER SPECIFIED ANXIETY DISORDERS: ICD-10-CM

## 2024-10-24 DIAGNOSIS — Z86.16 HISTORY OF COVID-19: ICD-10-CM

## 2024-10-24 DIAGNOSIS — B00.9 HERPESVIRAL INFECTION, UNSPECIFIED: ICD-10-CM

## 2024-10-24 DIAGNOSIS — I10 PRIMARY HYPERTENSION: ICD-10-CM

## 2024-10-24 DIAGNOSIS — F41.8 DEPRESSION WITH ANXIETY: ICD-10-CM

## 2024-10-24 DIAGNOSIS — E03.9 HYPOTHYROIDISM, UNSPECIFIED TYPE: ICD-10-CM

## 2024-10-24 DIAGNOSIS — Z11.59 NEED FOR HEPATITIS C SCREENING TEST: ICD-10-CM

## 2024-10-24 DIAGNOSIS — E11.9 CONTROLLED TYPE 2 DIABETES MELLITUS WITHOUT COMPLICATION, WITHOUT LONG-TERM CURRENT USE OF INSULIN (MULTI): ICD-10-CM

## 2024-10-24 DIAGNOSIS — I10 HYPERTENSION, UNSPECIFIED TYPE: ICD-10-CM

## 2024-10-24 DIAGNOSIS — K21.9 GASTROESOPHAGEAL REFLUX DISEASE WITHOUT ESOPHAGITIS: ICD-10-CM

## 2024-10-24 DIAGNOSIS — J45.30 MILD PERSISTENT ASTHMA WITHOUT COMPLICATION (HHS-HCC): ICD-10-CM

## 2024-10-24 DIAGNOSIS — F41.1 GENERALIZED ANXIETY DISORDER: ICD-10-CM

## 2024-10-24 DIAGNOSIS — E66.9 TYPE 2 DIABETES MELLITUS WITH OBESITY (MULTI): ICD-10-CM

## 2024-10-24 DIAGNOSIS — I10 ESSENTIAL (PRIMARY) HYPERTENSION: ICD-10-CM

## 2024-10-24 DIAGNOSIS — E11.69 TYPE 2 DIABETES MELLITUS WITH OBESITY (MULTI): ICD-10-CM

## 2024-10-24 DIAGNOSIS — R39.9 URINARY SYMPTOM OR SIGN: ICD-10-CM

## 2024-10-24 DIAGNOSIS — M81.8 OTHER OSTEOPOROSIS, UNSPECIFIED PATHOLOGICAL FRACTURE PRESENCE: ICD-10-CM

## 2024-10-24 PROBLEM — M46.1 BILATERAL SACROILIITIS (CMS-HCC): Status: RESOLVED | Noted: 2023-02-13 | Resolved: 2024-10-24

## 2024-10-24 PROCEDURE — 3074F SYST BP LT 130 MM HG: CPT | Performed by: FAMILY MEDICINE

## 2024-10-24 PROCEDURE — 87086 URINE CULTURE/COLONY COUNT: CPT

## 2024-10-24 PROCEDURE — 1123F ACP DISCUSS/DSCN MKR DOCD: CPT | Performed by: FAMILY MEDICINE

## 2024-10-24 PROCEDURE — 1036F TOBACCO NON-USER: CPT | Performed by: FAMILY MEDICINE

## 2024-10-24 PROCEDURE — 99214 OFFICE O/P EST MOD 30 MIN: CPT | Performed by: FAMILY MEDICINE

## 2024-10-24 PROCEDURE — 36415 COLL VENOUS BLD VENIPUNCTURE: CPT

## 2024-10-24 PROCEDURE — 83036 HEMOGLOBIN GLYCOSYLATED A1C: CPT

## 2024-10-24 PROCEDURE — 1157F ADVNC CARE PLAN IN RCRD: CPT | Performed by: FAMILY MEDICINE

## 2024-10-24 PROCEDURE — G0472 HEP C SCREEN HIGH RISK/OTHER: HCPCS

## 2024-10-24 PROCEDURE — 3078F DIAST BP <80 MM HG: CPT | Performed by: FAMILY MEDICINE

## 2024-10-24 PROCEDURE — 1159F MED LIST DOCD IN RCRD: CPT | Performed by: FAMILY MEDICINE

## 2024-10-24 RX ORDER — VALACYCLOVIR HYDROCHLORIDE 1 G/1
1000 TABLET, FILM COATED ORAL DAILY
Qty: 90 TABLET | Refills: 1 | Status: SHIPPED | OUTPATIENT
Start: 2024-10-24 | End: 2025-04-22

## 2024-10-24 RX ORDER — ESCITALOPRAM OXALATE 10 MG/1
10 TABLET ORAL DAILY
Qty: 90 TABLET | Refills: 1 | Status: SHIPPED | OUTPATIENT
Start: 2024-10-24 | End: 2025-04-22

## 2024-10-24 RX ORDER — BUDESONIDE AND FORMOTEROL FUMARATE DIHYDRATE 80; 4.5 UG/1; UG/1
2 AEROSOL RESPIRATORY (INHALATION)
Qty: 10.2 G | Refills: 1 | Status: SHIPPED | OUTPATIENT
Start: 2024-10-24 | End: 2025-10-24

## 2024-10-24 RX ORDER — AMLODIPINE BESYLATE 10 MG/1
10 TABLET ORAL DAILY
Qty: 90 TABLET | Refills: 1 | Status: SHIPPED | OUTPATIENT
Start: 2024-10-24

## 2024-10-24 RX ORDER — PANTOPRAZOLE SODIUM 40 MG/1
40 TABLET, DELAYED RELEASE ORAL
Qty: 90 TABLET | Refills: 1 | Status: SHIPPED | OUTPATIENT
Start: 2024-10-24

## 2024-10-24 RX ORDER — PRAVASTATIN SODIUM 10 MG/1
10 TABLET ORAL
Qty: 90 TABLET | Refills: 1 | Status: SHIPPED | OUTPATIENT
Start: 2024-10-24

## 2024-10-24 RX ORDER — ESCITALOPRAM OXALATE 5 MG/1
5 TABLET ORAL
Qty: 90 TABLET | Refills: 1 | Status: SHIPPED | OUTPATIENT
Start: 2024-10-24

## 2024-10-24 RX ORDER — MONTELUKAST SODIUM 10 MG/1
10 TABLET ORAL
Qty: 90 TABLET | Refills: 1 | Status: SHIPPED | OUTPATIENT
Start: 2024-10-24

## 2024-10-24 RX ORDER — LEVOTHYROXINE SODIUM 112 UG/1
112 TABLET ORAL
Qty: 90 TABLET | Refills: 1 | Status: SHIPPED | OUTPATIENT
Start: 2024-10-24

## 2024-10-24 RX ORDER — HYDROCHLOROTHIAZIDE 12.5 MG/1
12.5 CAPSULE ORAL DAILY
Qty: 90 CAPSULE | Refills: 1 | Status: SHIPPED | OUTPATIENT
Start: 2024-10-24

## 2024-10-24 RX ORDER — LOSARTAN POTASSIUM 100 MG/1
100 TABLET ORAL DAILY
Qty: 90 TABLET | Refills: 1 | Status: SHIPPED | OUTPATIENT
Start: 2024-10-24

## 2024-10-24 NOTE — PROGRESS NOTES
Subjective   Patient ID: Nam Bernardo is a 74 y.o. female who presents for Follow-up (6 mth fuv - pt states she has issue with her back, test to see if she has heart disease, otc uti test showed positive, never feel good, ask about covid vaccine after having covid in July./Pt decline flu vaccine today, had vaccine.).  HPI  Pt here for 6 month visit.      Interval Health :     at last visit ,  6 m ago, described  Facial Skin rash  , feeling Sob w exercise  / face/ sinus congestion   Referred to Dermatology - states skin rash cleared up/ they treated it     PMHx   TEJINDER not treated  / HL / Hypothyroid/ Mgravis/ Oporosis /  Anxiety   /HTN      Interval Changes in PMHx. PSHx, FMHx :   denies     Concerns/Questions: YES  Chief Complaint   Patient presents with    Follow-up     6 mth fuv - pt states she has issue with her back, test to see if she has heart disease, otc uti test showed positive, never feel good, ask about covid vaccine after having covid in July.  Pt decline flu vaccine today, had vaccine.     Right back pain ,  radiates to right front.  Has had it for a long time.  States he brother had back pain,  so severe he committed suicide .  Tearful .  Afraidofthis happening to her.   Takes advil  , uses ice pack , does not want pain pills.  Does not feel depressed or suicidal.        Tylenol - doesn't do much  Keeps her from doing things. Going out    Hx of pud onAdvil  . Rash on celebrex   Otc Creams  Ice packs     Home UTI test positive  . Worries about this .  She has some stool incontinence from Mgravis, and so she feels it is causing her uti    Dtr told her about a test for heart disease - blood test    Wanted to know if ok to getCovid Vax now   Review of Systems  Mammo 10/2024     Objective   /67 (BP Location: Right arm, Patient Position: Sitting, BP Cuff Size: Large adult)   Pulse 69   Temp 37.1 °C (98.8 °F) (Temporal)   Resp 14   Wt 101 kg (223 lb 11.2 oz)   SpO2 95%   BMI 39.63 kg/m²      Physical Exam    Alert,  no acute distress.  Anxious affect  CV  regular  Lungs clear no rales  Back non tender to palpation, mid back.   Mild pain , right lower back parasp muscles  Ue and le strength normal and symmetric         Assessment/Plan   Problem List Items Addressed This Visit          Medium    Benign essential hypertension - Primary    Controlled type 2 diabetes mellitus without complication, without long-term current use of insulin (Multi)    Depression with anxiety    Relevant Medications    escitalopram (Lexapro) 5 mg tablet    Generalized anxiety disorder    GERD (gastroesophageal reflux disease)    Relevant Medications    pantoprazole (ProtoNix) 40 mg EC tablet    Hyperlipemia    Relevant Medications    pravastatin (Pravachol) 10 mg tablet    Hypertension    Relevant Medications    losartan (Cozaar) 100 mg tablet    amLODIPine (Norvasc) 10 mg tablet    Hypothyroidism    Relevant Medications    levothyroxine (Synthroid, Levoxyl) 112 mcg tablet    Osteoporosis    Type 2 diabetes mellitus with obesity (Multi)     Other Visit Diagnoses       Urinary symptom or sign        Relevant Orders    Urine Culture    History of COVID-19        Other specified anxiety disorders        Relevant Medications    escitalopram (Lexapro) 10 mg tablet    Essential (primary) hypertension        Relevant Medications    hydroCHLOROthiazide (Microzide) 12.5 mg capsule    Mild persistent asthma without complication ([facility])        Relevant Medications    montelukast (Singulair) 10 mg tablet    budesonide-formoteroL (Symbicort) 80-4.5 mcg/actuation inhaler    Herpesviral infection, unspecified        Relevant Medications    valACYclovir (Valtrex) 1 gram tablet            DM2 diet controlled   Lab Results   Component Value Date    HGBA1C 5.8 (H) 04/16/2024   Recommend repeat labs.  She was not aware there were labs placed in April.  Will go to lab today       HTN    Stable. Medication Renewed     Osteoporosis  Continue  Prolia     Urinary sxs- additional testing ordered  ; treat based on culture   Blood test for heart dz- I am not aware of a new test.  She will get more information from her dtr    Chronic back pain    Degen disc  , sp stenosis   - discussed txs .   - no weakness on exam  - declines pain medication   - no nsaid , allergy/ gi upset  - tylenol is ok. Using walker for distance   Facial skin rash - resolved   Med renewals done where needed .       MASOUD Williamson MD

## 2024-10-25 LAB
BACTERIA UR CULT: NO GROWTH
EST. AVERAGE GLUCOSE BLD GHB EST-MCNC: 114 MG/DL
HBA1C MFR BLD: 5.6 %
HCV AB SER QL: NONREACTIVE

## 2024-12-16 ENCOUNTER — TELEPHONE (OUTPATIENT)
Dept: PRIMARY CARE | Facility: CLINIC | Age: 74
End: 2024-12-16
Payer: MEDICARE

## 2024-12-18 ENCOUNTER — OFFICE VISIT (OUTPATIENT)
Dept: PRIMARY CARE | Facility: CLINIC | Age: 74
End: 2024-12-18
Payer: MEDICARE

## 2024-12-18 VITALS
DIASTOLIC BLOOD PRESSURE: 66 MMHG | WEIGHT: 221.5 LBS | TEMPERATURE: 97.5 F | OXYGEN SATURATION: 93 % | HEART RATE: 66 BPM | BODY MASS INDEX: 39.24 KG/M2 | RESPIRATION RATE: 12 BRPM | SYSTOLIC BLOOD PRESSURE: 121 MMHG

## 2024-12-18 DIAGNOSIS — R06.02 SHORTNESS OF BREATH: ICD-10-CM

## 2024-12-18 DIAGNOSIS — J45.31 MILD PERSISTENT ASTHMA WITH ACUTE EXACERBATION (HHS-HCC): Primary | ICD-10-CM

## 2024-12-18 DIAGNOSIS — J30.9 ALLERGIC RHINITIS, UNSPECIFIED: ICD-10-CM

## 2024-12-18 DIAGNOSIS — E11.9 CONTROLLED TYPE 2 DIABETES MELLITUS WITHOUT COMPLICATION, WITHOUT LONG-TERM CURRENT USE OF INSULIN (MULTI): ICD-10-CM

## 2024-12-18 PROCEDURE — 3074F SYST BP LT 130 MM HG: CPT | Performed by: FAMILY MEDICINE

## 2024-12-18 PROCEDURE — 1159F MED LIST DOCD IN RCRD: CPT | Performed by: FAMILY MEDICINE

## 2024-12-18 PROCEDURE — 99214 OFFICE O/P EST MOD 30 MIN: CPT | Performed by: FAMILY MEDICINE

## 2024-12-18 PROCEDURE — 3078F DIAST BP <80 MM HG: CPT | Performed by: FAMILY MEDICINE

## 2024-12-18 PROCEDURE — 1157F ADVNC CARE PLAN IN RCRD: CPT | Performed by: FAMILY MEDICINE

## 2024-12-18 PROCEDURE — 1036F TOBACCO NON-USER: CPT | Performed by: FAMILY MEDICINE

## 2024-12-18 PROCEDURE — 1160F RVW MEDS BY RX/DR IN RCRD: CPT | Performed by: FAMILY MEDICINE

## 2024-12-18 PROCEDURE — 1123F ACP DISCUSS/DSCN MKR DOCD: CPT | Performed by: FAMILY MEDICINE

## 2024-12-18 RX ORDER — ALBUTEROL SULFATE 0.83 MG/ML
3 SOLUTION RESPIRATORY (INHALATION) ONCE
OUTPATIENT
Start: 2024-12-18 | End: 2024-12-18

## 2024-12-18 RX ORDER — PREDNISONE 20 MG/1
TABLET ORAL
Qty: 21 TABLET | Refills: 0 | Status: SHIPPED | OUTPATIENT
Start: 2024-12-18

## 2024-12-18 RX ORDER — FLUTICASONE PROPIONATE 50 MCG
2 SPRAY, SUSPENSION (ML) NASAL DAILY
Qty: 16 G | Refills: 5 | Status: SHIPPED | OUTPATIENT
Start: 2024-12-18

## 2024-12-18 RX ORDER — ALBUTEROL SULFATE 90 UG/1
1 INHALANT RESPIRATORY (INHALATION) ONCE
OUTPATIENT
Start: 2024-12-18

## 2024-12-18 RX ORDER — BLOOD SUGAR DIAGNOSTIC
STRIP MISCELLANEOUS
Qty: 50 STRIP | Refills: 2 | Status: SHIPPED | OUTPATIENT
Start: 2024-12-18

## 2024-12-18 NOTE — PROGRESS NOTES
Subjective   Patient ID: Nam Bernardo is a 74 y.o. female who presents for DIFFICULTY CATCHING BREATH (Pt presents for difficulty catching breath, shortness of breath, dizzy, sinus drainage X1-2 wks but has had it in the past./Pt had flu & covid vaccines.).  HPI  Here w granddtr .     Pt describes dyspnea,  no better than when she reported it to me in April of this year.  Maybe slightly better.  Recently was very bad ,  about 5 d ago and used her rescue inhaler with minimal improvement.   Was on the phone with family member who noticed her short breathing and encouraged todays appt.   Pt states her breathing is nothing as bad as it had been.      Adherent to maintenance therapy -symbicort,  singulair    Activity - very little  No PND. No orthopnea .   No edema in legs  Denies recent uri / sore throat    Coughs in the morning, and sometimes in evening. Usually white phlegm    ALSO HAS   Lightheadedness that comes and goes.  Not really sure if related to breathing, doesn't feel like it. Usually when changing positions . Not all the time.   No falls/ near syncope     Review of Systems  Denies chest pain   Objective   /66 (BP Location: Right arm, Patient Position: Sitting, BP Cuff Size: Large adult)   Pulse 66   Temp 36.4 °C (97.5 °F) (Temporal)   Resp 12   Wt 100 kg (221 lb 8 oz)   SpO2 93%   BMI 39.24 kg/m²     Physical Exam  Depressed affect     No acute resp distress. No use of accessory muscles, though states she feels slightly short of breath sitting here  CV: regular  Lungs:  decreased BS in the bases. No rales. No wheezes    Extrl no edema .       Assessment/Plan   Problem List Items Addressed This Visit          Medium    Controlled type 2 diabetes mellitus without complication, without long-term current use of insulin (Multi)    Relevant Medications    blood sugar diagnostic (OneTouch Ultra Test) strip    Dyspnea    Relevant Orders    CBC and Auto Differential    Comprehensive Metabolic Panel     XR chest 2 views    Complete Pulmonary Function Test (Spirometry/DLCO/Lung Volumes)    Echocardiogram Stress Test     Other Visit Diagnoses       Mild persistent asthma with acute exacerbation ([facility])    -  Primary    Relevant Medications    predniSONE (Deltasone) 20 mg tablet    Allergic rhinitis, unspecified        Relevant Medications    fluticasone (Flonase) 50 mcg/actuation nasal spray              Exac of asthma   -- oral prednisone  - - continue other inhalers      Dyspnea , for most of the year  - recommend PFT .  Order sharon randall pt will go to [facility] , in [place] , and will make own appt  - Stress Test- order place,sharon and gave pt number for scheduling.   - CXR   -possibly a different maintenance inhaler. Depends on above     MASOUD Williamson MD

## 2024-12-18 NOTE — PATIENT INSTRUCTIONS
Schedule Pulmonary Testing  To schedule an appointment, please call [phone].  [facility]  [address] [place] [address]  [facility] [place]   [address] [place] [address]    2.  I think you have an asthma flare up and am treating this with Prednisone    3.  I recommend a Cardiac Stress Test as well. For Scheduling: [address] at [phone]    4 Labwork and xray -  no appointment needed.     Followup : after testing     Dr. Williamson

## 2024-12-19 ENCOUNTER — HOSPITAL ENCOUNTER (OUTPATIENT)
Dept: RADIOLOGY | Facility: CLINIC | Age: 74
Discharge: HOME | End: 2024-12-19
Payer: MEDICARE

## 2024-12-19 ENCOUNTER — LAB (OUTPATIENT)
Dept: LAB | Facility: LAB | Age: 74
End: 2024-12-19
Payer: MEDICARE

## 2024-12-19 DIAGNOSIS — R06.02 SHORTNESS OF BREATH: ICD-10-CM

## 2024-12-19 PROCEDURE — 71046 X-RAY EXAM CHEST 2 VIEWS: CPT

## 2024-12-19 PROCEDURE — 85025 COMPLETE CBC W/AUTO DIFF WBC: CPT

## 2024-12-19 PROCEDURE — 80053 COMPREHEN METABOLIC PANEL: CPT

## 2024-12-19 PROCEDURE — 36415 COLL VENOUS BLD VENIPUNCTURE: CPT

## 2024-12-19 PROCEDURE — 71046 X-RAY EXAM CHEST 2 VIEWS: CPT | Performed by: RADIOLOGY

## 2024-12-20 LAB
ALBUMIN SERPL BCP-MCNC: 4.5 G/DL (ref 3.4–5)
ALP SERPL-CCNC: 68 U/L (ref 33–136)
ALT SERPL W P-5'-P-CCNC: 13 U/L (ref 7–45)
ANION GAP SERPL CALC-SCNC: 11 MMOL/L (ref 10–20)
AST SERPL W P-5'-P-CCNC: 17 U/L (ref 9–39)
BASOPHILS # BLD AUTO: 0.04 X10*3/UL (ref 0–0.1)
BASOPHILS NFR BLD AUTO: 0.6 %
BILIRUB SERPL-MCNC: 0.6 MG/DL (ref 0–1.2)
BUN SERPL-MCNC: 14 MG/DL (ref 6–23)
CALCIUM SERPL-MCNC: 10.1 MG/DL (ref 8.6–10.6)
CHLORIDE SERPL-SCNC: 97 MMOL/L (ref 98–107)
CO2 SERPL-SCNC: 29 MMOL/L (ref 21–32)
CREAT SERPL-MCNC: 0.68 MG/DL (ref 0.5–1.05)
EGFRCR SERPLBLD CKD-EPI 2021: 90 ML/MIN/1.73M*2
EOSINOPHIL # BLD AUTO: 0.09 X10*3/UL (ref 0–0.4)
EOSINOPHIL NFR BLD AUTO: 1.4 %
ERYTHROCYTE [DISTWIDTH] IN BLOOD BY AUTOMATED COUNT: 12.5 % (ref 11.5–14.5)
GLUCOSE SERPL-MCNC: 107 MG/DL (ref 74–99)
HCT VFR BLD AUTO: 37.7 % (ref 36–46)
HGB BLD-MCNC: 13.2 G/DL (ref 12–16)
IMM GRANULOCYTES # BLD AUTO: 0.02 X10*3/UL (ref 0–0.5)
IMM GRANULOCYTES NFR BLD AUTO: 0.3 % (ref 0–0.9)
LYMPHOCYTES # BLD AUTO: 1.35 X10*3/UL (ref 0.8–3)
LYMPHOCYTES NFR BLD AUTO: 20.4 %
MCH RBC QN AUTO: 30.4 PG (ref 26–34)
MCHC RBC AUTO-ENTMCNC: 35 G/DL (ref 32–36)
MCV RBC AUTO: 87 FL (ref 80–100)
MONOCYTES # BLD AUTO: 0.3 X10*3/UL (ref 0.05–0.8)
MONOCYTES NFR BLD AUTO: 4.5 %
NEUTROPHILS # BLD AUTO: 4.82 X10*3/UL (ref 1.6–5.5)
NEUTROPHILS NFR BLD AUTO: 72.8 %
NRBC BLD-RTO: 0 /100 WBCS (ref 0–0)
PLATELET # BLD AUTO: 289 X10*3/UL (ref 150–450)
POTASSIUM SERPL-SCNC: 4.4 MMOL/L (ref 3.5–5.3)
PROT SERPL-MCNC: 7 G/DL (ref 6.4–8.2)
RBC # BLD AUTO: 4.34 X10*6/UL (ref 4–5.2)
SODIUM SERPL-SCNC: 133 MMOL/L (ref 136–145)
WBC # BLD AUTO: 6.6 X10*3/UL (ref 4.4–11.3)

## 2024-12-26 ENCOUNTER — TELEPHONE (OUTPATIENT)
Dept: PRIMARY CARE | Facility: CLINIC | Age: 74
End: 2024-12-26
Payer: MEDICARE

## 2024-12-26 NOTE — TELEPHONE ENCOUNTER
Pt received a letter in the mail for jury duty. She thinks she is unable to do this. Pt want to know if you can write a letter to get her out of this. Jury duty is on 1/13/25. Her badge number is #[id number].

## 2025-01-16 ENCOUNTER — APPOINTMENT (OUTPATIENT)
Dept: CARDIOLOGY | Facility: CLINIC | Age: 75
End: 2025-01-16
Payer: MEDICARE

## 2025-01-16 DIAGNOSIS — I10 PRIMARY HYPERTENSION: ICD-10-CM

## 2025-01-16 DIAGNOSIS — I10 ESSENTIAL (PRIMARY) HYPERTENSION: ICD-10-CM

## 2025-01-16 DIAGNOSIS — J30.9 ALLERGIC RHINITIS, UNSPECIFIED: ICD-10-CM

## 2025-01-16 DIAGNOSIS — J45.30 MILD PERSISTENT ASTHMA WITHOUT COMPLICATION (HHS-HCC): ICD-10-CM

## 2025-01-17 RX ORDER — HYDROCHLOROTHIAZIDE 12.5 MG/1
12.5 CAPSULE ORAL DAILY
Qty: 90 CAPSULE | Refills: 1 | Status: SHIPPED | OUTPATIENT
Start: 2025-01-17

## 2025-01-17 RX ORDER — FLUTICASONE PROPIONATE 50 MCG
2 SPRAY, SUSPENSION (ML) NASAL DAILY
Qty: 16 G | Refills: 5 | Status: SHIPPED | OUTPATIENT
Start: 2025-01-17

## 2025-01-17 RX ORDER — LOSARTAN POTASSIUM 100 MG/1
100 TABLET ORAL DAILY
Qty: 90 TABLET | Refills: 1 | Status: SHIPPED | OUTPATIENT
Start: 2025-01-17

## 2025-01-17 RX ORDER — MONTELUKAST SODIUM 10 MG/1
10 TABLET ORAL
Qty: 90 TABLET | Refills: 1 | Status: SHIPPED | OUTPATIENT
Start: 2025-01-17

## 2025-01-27 ENCOUNTER — TELEPHONE (OUTPATIENT)
Dept: PRIMARY CARE | Facility: CLINIC | Age: 75
End: 2025-01-27
Payer: MEDICARE

## 2025-01-27 NOTE — TELEPHONE ENCOUNTER
Patient recently discharged from ED for a pulled muscle.  They provided a lidocaine patch but she is still having pain.  She can not take NSAIDs due to a hx of bleeding ulcers.  Can you suggest something for her other than Tylenol?

## 2025-02-03 ENCOUNTER — PATIENT OUTREACH (OUTPATIENT)
Dept: PRIMARY CARE | Facility: CLINIC | Age: 75
End: 2025-02-03
Payer: MEDICARE

## 2025-02-03 NOTE — PROGRESS NOTES
Discharge Facility:[facility]   Discharge Diagnosis:Hyponatremia  Admission Date:1/29/2025  Discharge Date: 2/1/2025    PCP Appointment Date:ANGELO  Specialist Appointment Date: Dr Trinidad STEPHENS 2 wks  Hospital Encounter and Summary Linked: Yes  See discharge assessment below for further details  Wrap Up  Wrap Up Additional Comments: -- (Nam is feeling better. She will contact Dr. Abdi for follow up labs.) (2/3/2025  4:32 PM)    Engagement  Call Start Time: 1359 (2/3/2025  4:32 PM)    Medications  Medications reviewed with patient/caregiver?: Yes (No new medication) (2/3/2025  4:32 PM)  Is the patient having any side effects they believe may be caused by any medication additions or changes?: No (2/3/2025  4:32 PM)  Does the patient have all medications ordered at discharge?: Not applicable (2/3/2025  4:32 PM)  Care Management Interventions: No intervention needed (2/3/2025  4:32 PM)  Prescription Comments: -- (D/C HCTZ) (2/3/2025  4:32 PM)  Is the patient taking all medications as directed (includes completed medication regime)?: Yes (2/3/2025  4:32 PM)  Care Management Interventions: Provided patient education (Nam verbalized understanding of Fluid restriction 2000cc per day) (2/3/2025  4:32 PM)  Medication Comments: -- (Drema verbalized understanding) (2/3/2025  4:32 PM)    Appointments  Does the patient have a primary care provider?: Yes (2/3/2025  4:32 PM)  Care Management Interventions: -- (Message to staff, She will need after 2 so that her daughter can bring her.) (2/3/2025  4:32 PM)  Has the patient kept scheduled appointments due by today?: Yes (2/3/2025  4:32 PM)    Self Management  What is the home health agency?: -- (N/A) (2/3/2025  4:32 PM)  What Durable Medical Equipment (DME) was ordered?: -- (N/A) (2/3/2025  4:32 PM)    Patient Teaching  Does the patient have access to their discharge instructions?: Yes (2/3/2025  4:32 PM)  Care Management Interventions: Reviewed instructions with patient  (2/3/2025  4:32 PM)  What is the patient's perception of their health status since discharge?: Improving (2/3/2025  4:32 PM)  Is the patient/caregiver able to teach back the hierarchy of who to call/visit for symptoms/problems? PCP, Specialist, Home Health nurse, Urgent Care, ED, 911: Yes (2/3/2025  4:32 PM)

## 2025-02-04 ENCOUNTER — APPOINTMENT (OUTPATIENT)
Dept: CARDIOLOGY | Facility: CLINIC | Age: 75
End: 2025-02-04
Payer: MEDICARE

## 2025-02-04 ENCOUNTER — TELEPHONE (OUTPATIENT)
Dept: PRIMARY CARE | Facility: CLINIC | Age: 75
End: 2025-02-04

## 2025-02-04 NOTE — TELEPHONE ENCOUNTER
TCM Needs Scheduled  Received: Yesterday  KATHRYN Hollis1 [facility] Clerical  This patient was discharged from: [facility]    Discharge diagnosis: Hyponatremia   Date of discharge: 2/1/2025            Please reach out to patient and schedule an appointment BY: 2/14/2025    *Nam needs an afternoon after 2 appt so that her daughter can bring her.    Thank You!

## 2025-02-05 NOTE — TELEPHONE ENCOUNTER
Dr. Williamson, this was sent over by Teresa our outreach nurse. Are we able to work her in due to apt time needed or what is suggested?

## 2025-02-05 NOTE — TELEPHONE ENCOUNTER
Called pt and was able to get her scheduled for this Friday 2-7-25 at 3:30 pm for appointment. MARICRUZ 2-5-25

## 2025-02-07 ENCOUNTER — OFFICE VISIT (OUTPATIENT)
Dept: PRIMARY CARE | Facility: CLINIC | Age: 75
End: 2025-02-07
Payer: MEDICARE

## 2025-02-07 VITALS
DIASTOLIC BLOOD PRESSURE: 72 MMHG | TEMPERATURE: 98.9 F | WEIGHT: 218 LBS | OXYGEN SATURATION: 96 % | SYSTOLIC BLOOD PRESSURE: 138 MMHG | HEART RATE: 82 BPM | RESPIRATION RATE: 14 BRPM | BODY MASS INDEX: 38.62 KG/M2

## 2025-02-07 DIAGNOSIS — L30.4 INTERTRIGO: ICD-10-CM

## 2025-02-07 DIAGNOSIS — K40.20 BILATERAL INGUINAL HERNIA WITHOUT OBSTRUCTION OR GANGRENE, RECURRENCE NOT SPECIFIED: ICD-10-CM

## 2025-02-07 DIAGNOSIS — B37.2 YEAST INFECTION OF THE SKIN: ICD-10-CM

## 2025-02-07 DIAGNOSIS — E87.6 HYPOKALEMIA: ICD-10-CM

## 2025-02-07 DIAGNOSIS — E87.1 HYPONATREMIA: Primary | ICD-10-CM

## 2025-02-07 DIAGNOSIS — J45.30 MILD PERSISTENT ASTHMA WITHOUT COMPLICATION (HHS-HCC): ICD-10-CM

## 2025-02-07 DIAGNOSIS — K57.90 DIVERTICULOSIS: ICD-10-CM

## 2025-02-07 PROCEDURE — 1159F MED LIST DOCD IN RCRD: CPT | Performed by: FAMILY MEDICINE

## 2025-02-07 PROCEDURE — 3078F DIAST BP <80 MM HG: CPT | Performed by: FAMILY MEDICINE

## 2025-02-07 PROCEDURE — 1123F ACP DISCUSS/DSCN MKR DOCD: CPT | Performed by: FAMILY MEDICINE

## 2025-02-07 PROCEDURE — 3075F SYST BP GE 130 - 139MM HG: CPT | Performed by: FAMILY MEDICINE

## 2025-02-07 PROCEDURE — 1157F ADVNC CARE PLAN IN RCRD: CPT | Performed by: FAMILY MEDICINE

## 2025-02-07 PROCEDURE — 99214 OFFICE O/P EST MOD 30 MIN: CPT | Performed by: FAMILY MEDICINE

## 2025-02-07 RX ORDER — NYSTATIN 100000 [USP'U]/G
1 POWDER TOPICAL 2 TIMES DAILY
Qty: 60 G | Refills: 1 | Status: SHIPPED | OUTPATIENT
Start: 2025-02-07 | End: 2026-02-07

## 2025-02-07 RX ORDER — BUDESONIDE AND FORMOTEROL FUMARATE DIHYDRATE 80; 4.5 UG/1; UG/1
2 AEROSOL RESPIRATORY (INHALATION)
Qty: 10.2 G | Refills: 1 | Status: SHIPPED | OUTPATIENT
Start: 2025-02-07 | End: 2026-02-07

## 2025-02-07 ASSESSMENT — PATIENT HEALTH QUESTIONNAIRE - PHQ9
2. FEELING DOWN, DEPRESSED OR HOPELESS: NOT AT ALL
SUM OF ALL RESPONSES TO PHQ9 QUESTIONS 1 AND 2: 0
1. LITTLE INTEREST OR PLEASURE IN DOING THINGS: NOT AT ALL

## 2025-02-07 NOTE — PROGRESS NOTES
Subjective   Patient ID: Nam Bernardo is a 74 y.o. female who presents for HOSPITAL DISCHARGE  (Pt presents for hospital discharge visit - hyponatremia, tremor. Pt states her left upper arm from IV has sharp pain. How often should sodium and potassium level checked. Testing at hospital discovered bilateral hernia and diverticulitis./Pt had flu vaccine 11-11-24.).  HPI    ADMIT DATE: 1/29/2025  DISCHARGE DATE: 2/1/2025      Here w romeliar  .  Pt states she is also wearing red for her ggson (this granddtr's son) who has a serious heart condition .     Reviewed available notes     Was also in the ER for abd / side pain ,  and we did not do a followup visit / discussion, because she was then hospitalized for hyponatremia.      Side pain - continues on/off.  Dxed as msk .  Wondering what it is , what it is from .    Left upper arm hurts at times, sharp, since the hospitalization .   Has rash in groin  Questions about the findings on imaging- diverticulitis (??)  and hernias .    Review of Systems  Keeping to fluid restriction   Denies fever, chills, diarrhea ,  dysuria     Objective   /72 (BP Location: Left arm, Patient Position: Sitting, BP Cuff Size: Large adult)   Pulse 82   Temp 37.2 °C (98.9 °F) (Temporal)   Resp 14   Wt 98.9 kg (218 lb)   SpO2 96%   BMI 38.62 kg/m²     Physical Exam  Anxious  affect   Cv regular   Lungs no rales/ wheeze     Abdomen  active bs  .  Exam limited by body habitus.  No inguinal pain on palpation .  Intertrigo bilateral inguinal folds  I cannot detect any mass / hernia in the groin or where she has right lower abd pain .     Assessment/Plan   Problem List Items Addressed This Visit          Medium    Bilateral inguinal hernia without obstruction or gangrene    Diverticulosis    Hyponatremia - Primary    Relevant Orders    Basic Metabolic Panel     Other Visit Diagnoses       Hypokalemia        Relevant Orders    Basic Metabolic Panel    Intertrigo        Relevant  Medications    nystatin (Mycostatin) 100,000 unit/gram powder    Yeast infection of the skin        Mild persistent asthma without complication ([facility])        Relevant Medications    budesonide-formoteroL (Symbicort) 80-4.5 mcg/actuation inhaler              Hyponatremia  , hypokalemia   - from  excess water intake.    - following restriction at home  - monitor lytes,      Asthma-  currently asxic   -refill of med      Yeast infx/ intertrigo   - refilled med     Ing hernia small, bilat  New dxis .  Reviewed etiology ,  dxis,   eval, tx and follow up . Questions addressed .  Diverticulosis .  Clarified , not -itis  on the CT scan  . Clinically no signs/ sxs of - itis    MASOUD Williamson MD

## 2025-02-12 ENCOUNTER — APPOINTMENT (OUTPATIENT)
Dept: PRIMARY CARE | Facility: CLINIC | Age: 75
End: 2025-02-12
Payer: MEDICARE

## 2025-02-14 DIAGNOSIS — E87.1 HYPONATREMIA: Primary | ICD-10-CM

## 2025-02-14 LAB
ANION GAP SERPL CALCULATED.4IONS-SCNC: 12 MMOL/L (CALC) (ref 7–17)
BUN SERPL-MCNC: 20 MG/DL (ref 7–25)
BUN/CREAT SERPL: 34 (CALC) (ref 6–22)
CALCIUM SERPL-MCNC: 9.9 MG/DL (ref 8.6–10.4)
CHLORIDE SERPL-SCNC: 104 MMOL/L (ref 98–110)
CO2 SERPL-SCNC: 23 MMOL/L (ref 20–32)
CREAT SERPL-MCNC: 0.58 MG/DL (ref 0.6–1)
EGFRCR SERPLBLD CKD-EPI 2021: 94 ML/MIN/1.73M2
GLUCOSE SERPL-MCNC: 88 MG/DL (ref 65–99)
POTASSIUM SERPL-SCNC: 4.4 MMOL/L (ref 3.5–5.3)
SODIUM SERPL-SCNC: 139 MMOL/L (ref 135–146)

## 2025-02-18 ENCOUNTER — PATIENT OUTREACH (OUTPATIENT)
Dept: PRIMARY CARE | Facility: CLINIC | Age: 75
End: 2025-02-18
Payer: MEDICARE

## 2025-02-28 DIAGNOSIS — E87.1 HYPONATREMIA: ICD-10-CM

## 2025-02-28 LAB
ANION GAP SERPL CALCULATED.4IONS-SCNC: 10 MMOL/L (CALC) (ref 7–17)
BUN SERPL-MCNC: 16 MG/DL (ref 7–25)
BUN/CREAT SERPL: ABNORMAL (CALC) (ref 6–22)
CALCIUM SERPL-MCNC: 9.9 MG/DL (ref 8.6–10.4)
CHLORIDE SERPL-SCNC: 107 MMOL/L (ref 98–110)
CO2 SERPL-SCNC: 23 MMOL/L (ref 20–32)
CREAT SERPL-MCNC: 0.62 MG/DL (ref 0.6–1)
EGFRCR SERPLBLD CKD-EPI 2021: 92 ML/MIN/1.73M2
GLUCOSE SERPL-MCNC: 124 MG/DL (ref 65–99)
POTASSIUM SERPL-SCNC: 4.6 MMOL/L (ref 3.5–5.3)
SODIUM SERPL-SCNC: 140 MMOL/L (ref 135–146)

## 2025-03-08 DIAGNOSIS — F41.8 DEPRESSION WITH ANXIETY: ICD-10-CM

## 2025-03-08 DIAGNOSIS — F51.01 PRIMARY INSOMNIA: ICD-10-CM

## 2025-03-08 DIAGNOSIS — E78.5 HYPERLIPIDEMIA, UNSPECIFIED HYPERLIPIDEMIA TYPE: ICD-10-CM

## 2025-03-10 RX ORDER — TRAZODONE HYDROCHLORIDE 50 MG/1
50 TABLET ORAL NIGHTLY
Qty: 90 TABLET | Refills: 1 | Status: SHIPPED | OUTPATIENT
Start: 2025-03-10

## 2025-03-10 RX ORDER — PRAVASTATIN SODIUM 10 MG/1
10 TABLET ORAL
Qty: 90 TABLET | Refills: 1 | Status: SHIPPED | OUTPATIENT
Start: 2025-03-10

## 2025-03-17 ENCOUNTER — TELEPHONE (OUTPATIENT)
Dept: PRIMARY CARE | Facility: CLINIC | Age: 75
End: 2025-03-17
Payer: MEDICARE

## 2025-03-17 ENCOUNTER — APPOINTMENT (OUTPATIENT)
Dept: CARDIOLOGY | Facility: CLINIC | Age: 75
End: 2025-03-17
Payer: MEDICARE

## 2025-03-17 DIAGNOSIS — R06.02 SHORTNESS OF BREATH: Primary | ICD-10-CM

## 2025-03-17 NOTE — TELEPHONE ENCOUNTER
I have put in an order for a different stress Echo ,and for this test ,she will not be on a treadmill.   For Scheduling:   Referral Management at [phone].

## 2025-03-17 NOTE — TELEPHONE ENCOUNTER
Pt called in stating she was supposed to receive a stress test on Friday but  was not able to receive testing due to them being scared she might fall off treadmill due to her weakness and wants to know is other another order you can prescribe ?

## 2025-03-19 ENCOUNTER — PATIENT OUTREACH (OUTPATIENT)
Dept: PRIMARY CARE | Facility: CLINIC | Age: 75
End: 2025-03-19
Payer: MEDICARE

## 2025-03-19 NOTE — PROGRESS NOTES
Call after hospitalization.  At time of outreach call the patient feels as if their condition has improved since last visit.  Reviewed the PCP appointment with the pt and addressed any questions or concerns.  Nam is doing well, she will fu with PCP next month.

## 2025-03-25 ENCOUNTER — TELEPHONE (OUTPATIENT)
Dept: PRIMARY CARE | Facility: CLINIC | Age: 75
End: 2025-03-25
Payer: MEDICARE

## 2025-03-25 DIAGNOSIS — R06.09 DYSPNEA ON EXERTION: Primary | ICD-10-CM

## 2025-03-25 RX ORDER — REGADENOSON 0.08 MG/ML
0.4 INJECTION, SOLUTION INTRAVENOUS
Status: CANCELLED | OUTPATIENT
Start: 2025-03-25

## 2025-03-25 RX ORDER — AMINOPHYLLINE 25 MG/ML
125 INJECTION, SOLUTION INTRAVENOUS ONCE AS NEEDED
Status: CANCELLED | OUTPATIENT
Start: 2025-03-25

## 2025-03-25 NOTE — PROGRESS NOTES
Subjective        Nam Bernardo is a 74 y.o. female who presents for      HPI:          No chief complaint on file.        What brings patient in to office today?        when did pain start?      did pt fall or have injury?          any pain at night?      has pain kept patient from doing any activities or going to work?      are any positions more painful - sitting , lying, standing?      what has pt tried for pain?      what has helped?      has pt ever had xrays/MRI of affected area ?    if so, when?    has pt seen ortho?    has pt seen PT?         - pt was in Hospital in Feb - had follow up with her PCP- Dr Williamson - 2/7/25 -   Stress Echo - ordered - for 3/17/25- scheduled 3/31/25- see message Dr Williamson- =due to prior authorization testing   Had diverticulitis/bilateral hernia   Hyponatremia - resolved  Abn potassium- resolved   Tremor   Side/arm pain   Rash   Asthma         Social History     Tobacco Use   Smoking Status Never   Smokeless Tobacco Never         Social History     Substance and Sexual Activity   Alcohol Use Not Currently          Review of Systems:    Review of Systems    Objective        There were no vitals filed for this visit.      Patient is alert and oriented x 3 , NAD  HEAD- normocephalic and atraumatic   EYES-conjunctiva-normal, lids - normal  EARS/NOSE- normal external exam   NECK-supple,FROM  CV- RRR without murmur  PULM- CTA bilaterally, normal respiratory effort  RESPIRATORY EFFORT- normal , no retractions or nasal flaring   ABD- normoactive BS's   EXT- no edema,NT  SKIN- no abnormal skin lesions noted  NEURO- no focal deficits  PSYCH- pleasant, normal judgement and insight                BP Readings from Last 3 Encounters:   02/07/25 138/72   12/18/24 121/66   10/24/24 119/67           Wt Readings from Last 3 Encounters:   02/07/25 98.9 kg (218 lb)   12/18/24 100 kg (221 lb 8 oz)   10/24/24 101 kg (223 lb 11.2 oz)         BMI Readings from Last 3 Encounters:   02/07/25 38.62 kg/m²    12/18/24 39.24 kg/m²   10/24/24 39.63 kg/m²           Assessment & Plan  Muscle strain                              Call if no better or if symptoms worsen

## 2025-03-25 NOTE — TELEPHONE ENCOUNTER
There are papers for a denial needing peer to peer for her Stress Echo that were given to me yesterday (ordered by Dr. Williamson). I spoke with the reviewer today and it was approved.   Approval number is [id number]  Approval starts 3/18/25 and is good until 9/21/25    Please inform whoever needs this information for the patient to go forward with the testing. Thank you.

## 2025-03-26 ENCOUNTER — APPOINTMENT (OUTPATIENT)
Dept: PRIMARY CARE | Facility: CLINIC | Age: 75
End: 2025-03-26
Payer: MEDICARE

## 2025-03-26 ENCOUNTER — OFFICE VISIT (OUTPATIENT)
Dept: URGENT CARE | Age: 75
End: 2025-03-26
Payer: MEDICARE

## 2025-03-26 ENCOUNTER — TELEPHONE (OUTPATIENT)
Dept: PRIMARY CARE | Facility: CLINIC | Age: 75
End: 2025-03-26

## 2025-03-26 VITALS
OXYGEN SATURATION: 97 % | TEMPERATURE: 98 F | BODY MASS INDEX: 36.32 KG/M2 | HEART RATE: 77 BPM | SYSTOLIC BLOOD PRESSURE: 153 MMHG | HEIGHT: 63 IN | DIASTOLIC BLOOD PRESSURE: 68 MMHG | WEIGHT: 205 LBS | RESPIRATION RATE: 20 BRPM

## 2025-03-26 DIAGNOSIS — J01.00 ACUTE NON-RECURRENT MAXILLARY SINUSITIS: Primary | ICD-10-CM

## 2025-03-26 DIAGNOSIS — T14.8XXA MUSCLE STRAIN: Primary | ICD-10-CM

## 2025-03-26 DIAGNOSIS — R09.81 SINUS CONGESTION: ICD-10-CM

## 2025-03-26 LAB
POC CORONAVIRUS SARS-COV-2 PCR: NEGATIVE
POC HUMAN RHINOVIRUS PCR: NEGATIVE
POC INFLUENZA A VIRUS PCR: NEGATIVE
POC INFLUENZA B VIRUS PCR: NEGATIVE
POC RESPIRATORY SYNCYTIAL VIRUS PCR: NEGATIVE

## 2025-03-26 PROCEDURE — 1157F ADVNC CARE PLAN IN RCRD: CPT | Performed by: FAMILY MEDICINE

## 2025-03-26 PROCEDURE — 3078F DIAST BP <80 MM HG: CPT | Performed by: FAMILY MEDICINE

## 2025-03-26 PROCEDURE — 1036F TOBACCO NON-USER: CPT | Performed by: FAMILY MEDICINE

## 2025-03-26 PROCEDURE — 1123F ACP DISCUSS/DSCN MKR DOCD: CPT | Performed by: FAMILY MEDICINE

## 2025-03-26 PROCEDURE — 99204 OFFICE O/P NEW MOD 45 MIN: CPT | Performed by: FAMILY MEDICINE

## 2025-03-26 PROCEDURE — 1160F RVW MEDS BY RX/DR IN RCRD: CPT | Performed by: FAMILY MEDICINE

## 2025-03-26 PROCEDURE — 87631 RESP VIRUS 3-5 TARGETS: CPT | Performed by: FAMILY MEDICINE

## 2025-03-26 PROCEDURE — 1159F MED LIST DOCD IN RCRD: CPT | Performed by: FAMILY MEDICINE

## 2025-03-26 PROCEDURE — 3077F SYST BP >= 140 MM HG: CPT | Performed by: FAMILY MEDICINE

## 2025-03-26 RX ORDER — DOXYCYCLINE 100 MG/1
100 CAPSULE ORAL 2 TIMES DAILY
Qty: 20 CAPSULE | Refills: 0 | Status: SHIPPED | OUTPATIENT
Start: 2025-03-26 | End: 2025-04-05

## 2025-03-26 NOTE — PATIENT INSTRUCTIONS
Antibiotics as directed; take with food  Nasal saline as needed  Follow up with your Primary Care Provider in 3 to 5 days   Final Anesthesia Post-op Assessment    Patient: Omero Mathis  Procedure(s) Performed: LAPAROSCOPIC NISSEN FUNDOPLICATION  Anesthesia type: General    Vital Last Value   Temperature 36.5 °C (97.7 °F) (shivering noted despite temp,  given demerol and warm blanke) (02/13/18 1055)   Pulse 86 (02/13/18 1115)   Respiratory Rate 20 (02/13/18 1115)   Non-Invasive   Blood Pressure 121/57 (02/13/18 1115)   Arterial  Blood Pressure     Pulse Oximetry 100 % (02/13/18 1115)     Last 24 I/O:   Intake/Output Summary (Last 24 hours) at 02/13/18 1129  Last data filed at 02/13/18 1055   Gross per 24 hour   Intake             2000 ml   Output              520 ml   Net             1480 ml       PATIENT LOCATION: PACU Phase 1  POST-OP VITAL SIGNS: stable  LEVEL OF CONSCIOUSNESS: participates in exam, awake, oriented, answers questions appropriately and alert  RESPIRATORY STATUS: spontaneous ventilation and unassisted  CARDIOVASCULAR: blood pressure returned to baseline  HYDRATION: euvolemic    PAIN MANAGEMENT: well controlled  NAUSEA: None  AIRWAY PATENCY: patent  POST-OP ASSESSMENT: no complications, patient tolerated procedure well with no complications, no evidence of recall and sufficiently recovered from acute administration of anesthesia effects and able to participate in evaluation  COMPLICATIONS: none  HANDOFF:  Handoff to receiving nurse was performed and questions were answered

## 2025-03-26 NOTE — PROGRESS NOTES
Subjective   Patient ID: Nam Bernardo is a 74 y.o. female. She presents today with a chief complaint of Sinusitis.    History of Present Illness  Subjective  Nam Bernardo is a 74 y.o. female who presents for evaluation of symptoms of a URI. Symptoms include cough described as productive, nasal congestion, post nasal drip, and sinus pressure. Onset of symptoms was 4 days ago and has been gradually worsening since that time. Treatment to date: none.          Sinusitis      Past Medical History  Allergies as of 03/26/2025 - Reviewed 03/26/2025   Allergen Reaction Noted    Amoxicillin Hives and Other 02/13/2023    Benzonatate Itching and Swelling 02/13/2023    Lisinopril Hives 02/13/2023    Moxifloxacin Hives 02/13/2023    Naproxen Unknown 02/13/2023    Other Myalgia 02/07/2025    Paroxetine hcl Other 02/13/2023    Sertraline Other 02/13/2023    Celecoxib Rash 02/13/2023       (Not in a hospital admission)       Past Medical History:   Diagnosis Date    Acute upper respiratory infection, unspecified 01/19/2018    Viral URI with cough    Chronic instability of knee, unspecified knee 06/18/2014    Old disruption of medial collateral ligament    Dorsopathy, unspecified 04/21/2021    Multilevel degenerative disc disease    Dysphonia 12/07/2017    Dysphonia    Encounter for immunization 04/21/2021    Need for 23-polyvalent pneumococcal polysaccharide vaccine    Eyelid retraction unspecified eye, unspecified lid 12/07/2017    Lid lag    Impingement syndrome of unspecified shoulder 08/06/2015    Rotator cuff impingement syndrome    Localized swelling, mass and lump, head 10/17/2017    Tongue swelling    Pain in left ankle and joints of left foot 07/14/2014    Left ankle pain    Pain in left foot 07/15/2015    Left foot pain    Pain in left shoulder 07/15/2015    Shoulder pain, left    Personal history of other diseases of the circulatory system     H/O: HTN (hypertension)    Personal history of other diseases of the  "respiratory system     History of respiratory distress    Personal history of other endocrine, nutritional and metabolic disease     History of high cholesterol    Personal history of other endocrine, nutritional and metabolic disease 07/17/2018    History of hypokalemia    Personal history of other endocrine, nutritional and metabolic disease 08/11/2020    History of diabetes mellitus    Personal history of other infectious and parasitic diseases 11/09/2015    History of viral infection    Personal history of other infectious and parasitic diseases 01/15/2015    History of tinea corporis    Personal history of other specified conditions 11/27/2017    History of dysphagia    Personal history of other specified conditions 12/07/2017    History of dysphasia    Personal history of other specified conditions 06/18/2014    History of urinary frequency    Unspecified speech disturbances 11/27/2017    Difficulty speaking       Past Surgical History:   Procedure Laterality Date    COLONOSCOPY  11/27/2017    Complete Colonoscopy    NECK SURGERY  06/18/2014    Neck Surgery    OTHER SURGICAL HISTORY  06/18/2014    Uterine Surgery        reports that she has never smoked. She has never used smokeless tobacco. She reports that she does not currently use alcohol. She reports that she does not use drugs.    Review of Systems  Review of Systems                               Objective    Vitals:    03/26/25 1232   BP: 153/68   Pulse: 77   Resp: 20   Temp: 36.7 °C (98 °F)   TempSrc: Oral   SpO2: 97%   Weight: 93 kg (205 lb)   Height: 1.588 m (5' 2.5\")     No LMP recorded. Patient is postmenopausal.    Physical Exam  Vitals and nursing note reviewed.   Constitutional:       General: She is not in acute distress.     Appearance: Normal appearance. She is not ill-appearing or toxic-appearing.   HENT:      Right Ear: Tympanic membrane, ear canal and external ear normal.      Left Ear: Tympanic membrane, ear canal and external ear normal. "      Nose: Congestion and rhinorrhea present.      Mouth/Throat:      Mouth: Mucous membranes are moist.      Pharynx: Postnasal drip present. Oropharyngeal exudate: sinusiti.  Eyes:      Extraocular Movements: Extraocular movements intact.      Conjunctiva/sclera: Conjunctivae normal.      Pupils: Pupils are equal, round, and reactive to light.   Cardiovascular:      Rate and Rhythm: Normal rate and regular rhythm.      Pulses: Normal pulses.      Heart sounds: Normal heart sounds.   Pulmonary:      Effort: Pulmonary effort is normal.      Breath sounds: Normal breath sounds. No wheezing, rhonchi or rales.   Musculoskeletal:      Cervical back: Normal range of motion and neck supple. No rigidity or tenderness.   Lymphadenopathy:      Cervical: No cervical adenopathy.   Neurological:      Mental Status: She is alert.         Procedures    Point of Care Test & Imaging Results from this visit  No results found for this visit on 03/26/25.   No results found.    Diagnostic study results (if any) were reviewed by Malena Dior MD.    Assessment/Plan   Allergies, medications, history, and pertinent labs/EKGs/Imaging reviewed by Malena Dior MD.     Medical Decision Making      Orders and Diagnoses  Diagnoses and all orders for this visit:  Sinus congestion  -     POCT SPOTFIRE R/ST Panel Mini w/COVID ([facility]) manually resulted      Medical Admin Record      Patient disposition: Home    Electronically signed by Malena Dior MD  12:55 PM

## 2025-03-26 NOTE — TELEPHONE ENCOUNTER
WORKER'S COMPENSATION RETURN TO WORK REPORT   [address] [facility] [address]  [phone]    May 28, 2020  EMPLOYEE INFORMATION:   EMPLOYER INFORMATION:  NAME: Brad ARNOLD/CARMELO  : 1979     [phone]  DATE OF INJURY/EVENT: No linked episodes             APPOINTMENT INFORMATION:  Date: 2020.       Time out: 9:19 AM.   Location: [facility]   Treating Provider: Flip Douglas MD  .  DIAGNOSIS: Status post left shoulder arthroscopic surgery, 2020  STATUS: This injury/condition is work related    RETURN TO WORK:   Mr. Escalante is to return to work WITH the restrictions indicated below.     RESTRICTIONS:   Days after surgery                      Restrictions  (including weekends)    Days 1-4   Off work    Days 5-9   One-handed work: 2-4 hours/day.  Wear sling      Wounds covered, clean, dry, safety-sensitive environment    Days 10-14   One-handed work: 4-6 hours/day. Wear sling      Wounds covered, clean, dry, safety sensitive environment    Days 15-follow-up  One-handed work: 8 hours/day + maximum 5 days/week                appointment             Wear sling. Safety sensitive environment      If patient is using narcotic medication, they should not be involved in any safety sensitive functions such as operating a motor vehicle, heavy equipment, machinery, etc.    Restrictions are to be followed at work and at home    FOLLOW-UP VISIT: Approximately 2 weeks from surgery  Thank you for the privilege of providing medical care for this injury/condition.  If there are any questions, please call the clinic at Dept: [phone].    Electronically signed on 2020 at 9:19 AM by:   Flip Douglas MD       Patient arrived 26 mins late to her appt. She had set this appt on 03/24/25. She said that she never got a reminder call for appointment that is why she was late. She also said that the person that made appointment  told her 11:30 am . We are showing that appointment was made for 11am.  She said that she was coming in for congestion.  I explained to her that we don't have anything for her today. She was upset explained she could go to UC or ER.  She had that she did not what to go back to the ER she was there on 3/22/25 in [place]. I scheduled an appt for Monday with PCP

## 2025-03-31 ENCOUNTER — APPOINTMENT (OUTPATIENT)
Dept: CARDIOLOGY | Facility: CLINIC | Age: 75
End: 2025-03-31
Payer: MEDICARE

## 2025-03-31 ENCOUNTER — APPOINTMENT (OUTPATIENT)
Dept: PRIMARY CARE | Facility: CLINIC | Age: 75
End: 2025-03-31
Payer: MEDICARE

## 2025-04-03 ENCOUNTER — TELEPHONE (OUTPATIENT)
Dept: PRIMARY CARE | Facility: CLINIC | Age: 75
End: 2025-04-03

## 2025-04-03 DIAGNOSIS — F41.9 ANXIETY: Primary | ICD-10-CM

## 2025-04-03 RX ORDER — BUSPIRONE HYDROCHLORIDE 5 MG/1
5 TABLET ORAL 2 TIMES DAILY
Qty: 20 TABLET | Refills: 0 | Status: SHIPPED | OUTPATIENT
Start: 2025-04-03 | End: 2025-04-09

## 2025-04-03 NOTE — TELEPHONE ENCOUNTER
Patient called in and stated that she is feeling very nervous and once these feelings come she has a burning in her chest just does not know what she is anxious about she feels like she cannot drive so her daughter would have to bring her but cannot until this afternoon.

## 2025-04-03 NOTE — TELEPHONE ENCOUNTER
Spoke to pt.    Describes anxiety, no specific trigger. Starts in the daytime.     Decrease Lexapro to 10 mg .    Add Buspirone 5 mg bid till seen   Labwork ordered   Overbook next Weds in office 3pm /

## 2025-04-05 LAB
ANION GAP SERPL CALCULATED.4IONS-SCNC: 12 MMOL/L (CALC) (ref 7–17)
BUN SERPL-MCNC: 23 MG/DL (ref 7–25)
BUN/CREAT SERPL: 20 (CALC) (ref 6–22)
CALCIUM SERPL-MCNC: 9.9 MG/DL (ref 8.6–10.4)
CHLORIDE SERPL-SCNC: 107 MMOL/L (ref 98–110)
CO2 SERPL-SCNC: 22 MMOL/L (ref 20–32)
CREAT SERPL-MCNC: 1.16 MG/DL (ref 0.6–1)
EGFRCR SERPLBLD CKD-EPI 2021: 49 ML/MIN/1.73M2
GLUCOSE SERPL-MCNC: 122 MG/DL (ref 65–139)
POTASSIUM SERPL-SCNC: 4.4 MMOL/L (ref 3.5–5.3)
SODIUM SERPL-SCNC: 141 MMOL/L (ref 135–146)
TSH SERPL-ACNC: 0.7 MIU/L (ref 0.4–4.5)

## 2025-04-09 ENCOUNTER — APPOINTMENT (OUTPATIENT)
Dept: PRIMARY CARE | Facility: CLINIC | Age: 75
End: 2025-04-09
Payer: MEDICARE

## 2025-04-09 VITALS
OXYGEN SATURATION: 95 % | DIASTOLIC BLOOD PRESSURE: 74 MMHG | HEART RATE: 75 BPM | SYSTOLIC BLOOD PRESSURE: 145 MMHG | RESPIRATION RATE: 16 BRPM | BODY MASS INDEX: 35.82 KG/M2 | TEMPERATURE: 97.4 F | WEIGHT: 199 LBS

## 2025-04-09 DIAGNOSIS — F41.1 GENERALIZED ANXIETY DISORDER: Primary | ICD-10-CM

## 2025-04-09 PROCEDURE — 3078F DIAST BP <80 MM HG: CPT | Performed by: FAMILY MEDICINE

## 2025-04-09 PROCEDURE — 99213 OFFICE O/P EST LOW 20 MIN: CPT | Performed by: FAMILY MEDICINE

## 2025-04-09 PROCEDURE — 1159F MED LIST DOCD IN RCRD: CPT | Performed by: FAMILY MEDICINE

## 2025-04-09 PROCEDURE — 1160F RVW MEDS BY RX/DR IN RCRD: CPT | Performed by: FAMILY MEDICINE

## 2025-04-09 PROCEDURE — 1123F ACP DISCUSS/DSCN MKR DOCD: CPT | Performed by: FAMILY MEDICINE

## 2025-04-09 PROCEDURE — 3077F SYST BP >= 140 MM HG: CPT | Performed by: FAMILY MEDICINE

## 2025-04-09 PROCEDURE — 1157F ADVNC CARE PLAN IN RCRD: CPT | Performed by: FAMILY MEDICINE

## 2025-04-09 RX ORDER — BUSPIRONE HYDROCHLORIDE 10 MG/1
10 TABLET ORAL 2 TIMES DAILY
Qty: 60 TABLET | Refills: 1 | Status: SHIPPED | OUTPATIENT
Start: 2025-04-09 | End: 2025-06-08

## 2025-04-09 ASSESSMENT — PATIENT HEALTH QUESTIONNAIRE - PHQ9
7. TROUBLE CONCENTRATING ON THINGS, SUCH AS READING THE NEWSPAPER OR WATCHING TELEVISION: NOT AT ALL
5. POOR APPETITE OR OVEREATING: SEVERAL DAYS
4. FEELING TIRED OR HAVING LITTLE ENERGY: NEARLY EVERY DAY
1. LITTLE INTEREST OR PLEASURE IN DOING THINGS: NEARLY EVERY DAY
3. TROUBLE FALLING OR STAYING ASLEEP OR SLEEPING TOO MUCH: NEARLY EVERY DAY
SUM OF ALL RESPONSES TO PHQ9 QUESTIONS 1 AND 2: 4
2. FEELING DOWN, DEPRESSED OR HOPELESS: SEVERAL DAYS
9. THOUGHTS THAT YOU WOULD BE BETTER OFF DEAD, OR OF HURTING YOURSELF: NOT AT ALL
6. FEELING BAD ABOUT YOURSELF - OR THAT YOU ARE A FAILURE OR HAVE LET YOURSELF OR YOUR FAMILY DOWN: SEVERAL DAYS
10. IF YOU CHECKED OFF ANY PROBLEMS, HOW DIFFICULT HAVE THESE PROBLEMS MADE IT FOR YOU TO DO YOUR WORK, TAKE CARE OF THINGS AT HOME, OR GET ALONG WITH OTHER PEOPLE: SOMEWHAT DIFFICULT

## 2025-04-09 NOTE — PROGRESS NOTES
Subjective   Patient ID: Nam Bernardo is a 74 y.o. female who presents for Anxiety.  HPI  We discussed   over phone. Started Buspirone   5mg bid   and  lexapro decreased from 15 mg to 10mg .     Taking Buspirone as directed   Labs ok  .     Gets up in morning, gets ready / dressed,  has bf, takes meds as prescribed.   Has not wanted to go anywhere   Review of Systems    Objective   /74 (BP Location: Left arm, Patient Position: Sitting, BP Cuff Size: Large adult)   Pulse 75   Temp 36.3 °C (97.4 °F) (Temporal)   Resp 16   Wt 90.3 kg (199 lb)   SpO2 95%   BMI 35.82 kg/m²     Physical Exam      Pertinent exam : anxious affect  CV regular    Assessment/Plan   Problem List Items Addressed This Visit          Medium    Generalized anxiety disorder - Primary    Relevant Medications    busPIRone (Buspar) 10 mg tablet   Anxiety with increase in sxs. Possibly related to Serotonin .   Recommend counseling,  list of locations in the area provided.     Recommend increase strength of buspirone.      Dyspnea    Would like  stress test toward [place].  Has to get a ride, not able to get rides to [place]        Already has visit scheduled in 2 w    MCheryl Williamson MD

## 2025-04-09 NOTE — PATIENT INSTRUCTIONS
Increase the Buspirone to 10 mg twice a day  ( 8 am and 6 pm )    Continue the Lexapro 10 mg  once a day ( 8 am)  Trazodone 50 mg continue once at night.      Follow up in 2 weeks,  4/24/25  as scheduled.     Call in the meantime if needed.

## 2025-04-10 ENCOUNTER — TELEPHONE (OUTPATIENT)
Dept: PRIMARY CARE | Facility: CLINIC | Age: 75
End: 2025-04-10
Payer: MEDICARE

## 2025-04-10 DIAGNOSIS — L60.9 DISEASE OF NAIL: Primary | ICD-10-CM

## 2025-04-10 NOTE — TELEPHONE ENCOUNTER
Pt asked for a location closer to home , that does stress tests .     Can you please call and see if this location does Dobutamine Echo Stress Testing ?      [facility], [address] [address]    Cardiac & Pulmonary [phone]

## 2025-04-11 NOTE — TELEPHONE ENCOUNTER
[facility] and [facility] only does treadmill test not chemical, they said [facility] does. Gave patient info to call there to schedule. She also is asking if you know of a Podiatrist in the [place] area? Says she has a toe nail that is prison off.

## 2025-04-14 NOTE — TELEPHONE ENCOUNTER
Patient called back in and gave her the information for the Podiatry in [place] Dr. Shalini Bain  and phone # [phone].

## 2025-04-24 ENCOUNTER — APPOINTMENT (OUTPATIENT)
Dept: PRIMARY CARE | Facility: CLINIC | Age: 75
End: 2025-04-24
Payer: MEDICARE

## 2025-04-24 ENCOUNTER — TELEPHONE (OUTPATIENT)
Dept: PRIMARY CARE | Facility: CLINIC | Age: 75
End: 2025-04-24

## 2025-04-24 VITALS
TEMPERATURE: 98.2 F | HEART RATE: 75 BPM | WEIGHT: 195.7 LBS | DIASTOLIC BLOOD PRESSURE: 73 MMHG | RESPIRATION RATE: 14 BRPM | SYSTOLIC BLOOD PRESSURE: 132 MMHG | OXYGEN SATURATION: 96 % | BODY MASS INDEX: 35.22 KG/M2

## 2025-04-24 DIAGNOSIS — F41.8 DEPRESSION WITH ANXIETY: Primary | ICD-10-CM

## 2025-04-24 DIAGNOSIS — F41.8 DEPRESSION WITH ANXIETY: ICD-10-CM

## 2025-04-24 DIAGNOSIS — F41.1 GENERALIZED ANXIETY DISORDER: Primary | ICD-10-CM

## 2025-04-24 DIAGNOSIS — G70.00 MYASTHENIA GRAVIS WITHOUT (ACUTE) EXACERBATION: ICD-10-CM

## 2025-04-24 DIAGNOSIS — E66.9 TYPE 2 DIABETES MELLITUS WITH OBESITY (MULTI): ICD-10-CM

## 2025-04-24 DIAGNOSIS — E11.69 TYPE 2 DIABETES MELLITUS WITH OBESITY (MULTI): ICD-10-CM

## 2025-04-24 PROCEDURE — 1159F MED LIST DOCD IN RCRD: CPT | Performed by: FAMILY MEDICINE

## 2025-04-24 PROCEDURE — 3078F DIAST BP <80 MM HG: CPT | Performed by: FAMILY MEDICINE

## 2025-04-24 PROCEDURE — 1158F ADVNC CARE PLAN TLK DOCD: CPT | Performed by: FAMILY MEDICINE

## 2025-04-24 PROCEDURE — 3075F SYST BP GE 130 - 139MM HG: CPT | Performed by: FAMILY MEDICINE

## 2025-04-24 PROCEDURE — 1036F TOBACCO NON-USER: CPT | Performed by: FAMILY MEDICINE

## 2025-04-24 PROCEDURE — 1123F ACP DISCUSS/DSCN MKR DOCD: CPT | Performed by: FAMILY MEDICINE

## 2025-04-24 PROCEDURE — 99213 OFFICE O/P EST LOW 20 MIN: CPT | Performed by: FAMILY MEDICINE

## 2025-04-24 PROCEDURE — 1157F ADVNC CARE PLAN IN RCRD: CPT | Performed by: FAMILY MEDICINE

## 2025-04-24 RX ORDER — ESCITALOPRAM OXALATE 10 MG/1
10 TABLET ORAL DAILY
Qty: 90 TABLET | Refills: 1 | Status: SHIPPED | OUTPATIENT
Start: 2025-04-24 | End: 2025-10-21

## 2025-04-24 RX ORDER — NORTRIPTYLINE HYDROCHLORIDE 10 MG/1
10 CAPSULE ORAL NIGHTLY
Qty: 30 CAPSULE | Refills: 5 | Status: SHIPPED | OUTPATIENT
Start: 2025-04-24 | End: 2025-04-24 | Stop reason: ALTCHOICE

## 2025-04-24 RX ORDER — AMITRIPTYLINE HYDROCHLORIDE 10 MG/1
10 TABLET, FILM COATED ORAL NIGHTLY
Qty: 30 TABLET | Refills: 1 | Status: SHIPPED | OUTPATIENT
Start: 2025-04-24 | End: 2026-04-24

## 2025-04-24 RX ORDER — ESCITALOPRAM OXALATE 5 MG/1
5 TABLET ORAL
Qty: 90 TABLET | Refills: 1 | Status: SHIPPED | OUTPATIENT
Start: 2025-04-24

## 2025-04-24 ASSESSMENT — ANXIETY QUESTIONNAIRES
5. BEING SO RESTLESS THAT IT IS HARD TO SIT STILL: SEVERAL DAYS
IF YOU CHECKED OFF ANY PROBLEMS ON THIS QUESTIONNAIRE, HOW DIFFICULT HAVE THESE PROBLEMS MADE IT FOR YOU TO DO YOUR WORK, TAKE CARE OF THINGS AT HOME, OR GET ALONG WITH OTHER PEOPLE: VERY DIFFICULT
3. WORRYING TOO MUCH ABOUT DIFFERENT THINGS: NEARLY EVERY DAY
1. FEELING NERVOUS, ANXIOUS, OR ON EDGE: NEARLY EVERY DAY
GAD7 TOTAL SCORE: 15
6. BECOMING EASILY ANNOYED OR IRRITABLE: MORE THAN HALF THE DAYS
2. NOT BEING ABLE TO STOP OR CONTROL WORRYING: NEARLY EVERY DAY
7. FEELING AFRAID AS IF SOMETHING AWFUL MIGHT HAPPEN: SEVERAL DAYS
4. TROUBLE RELAXING: MORE THAN HALF THE DAYS

## 2025-04-24 ASSESSMENT — PATIENT HEALTH QUESTIONNAIRE - PHQ9
8. MOVING OR SPEAKING SO SLOWLY THAT OTHER PEOPLE COULD HAVE NOTICED. OR THE OPPOSITE, BEING SO FIGETY OR RESTLESS THAT YOU HAVE BEEN MOVING AROUND A LOT MORE THAN USUAL: NOT AT ALL
2. FEELING DOWN, DEPRESSED OR HOPELESS: MORE THAN HALF THE DAYS
7. TROUBLE CONCENTRATING ON THINGS, SUCH AS READING THE NEWSPAPER OR WATCHING TELEVISION: SEVERAL DAYS
9. THOUGHTS THAT YOU WOULD BE BETTER OFF DEAD, OR OF HURTING YOURSELF: NOT AT ALL
4. FEELING TIRED OR HAVING LITTLE ENERGY: MORE THAN HALF THE DAYS
SUM OF ALL RESPONSES TO PHQ9 QUESTIONS 1 AND 2: 5
6. FEELING BAD ABOUT YOURSELF - OR THAT YOU ARE A FAILURE OR HAVE LET YOURSELF OR YOUR FAMILY DOWN: MORE THAN HALF THE DAYS
1. LITTLE INTEREST OR PLEASURE IN DOING THINGS: NEARLY EVERY DAY
5. POOR APPETITE OR OVEREATING: MORE THAN HALF THE DAYS

## 2025-04-24 NOTE — TELEPHONE ENCOUNTER
Patient called and stated that pcp gave her a precription today for nortriptyline but the pharmacy red flagged it, there was an inactive ingredient, wanted to know if pcp could call something else in or find out why this was red flagged.

## 2025-04-24 NOTE — PROGRESS NOTES
Subjective   Patient ID: Nam Bernardo is a 74 y.o. female who presents for 6 month follow up.  HPI  Last routine visit 10/2024       INTERVAL   12/2024 acute, dyspnea   2/2025 Hospital discharge   3/2025 ER for side pain  3/2025 UC for sinus probs   4/2025 anxiety  , ER followup     MED adjustment for stress   1. Increase the Buspirone to 10 mg twice a day  ( 8 am and 6 pm )    2. Continue the Lexapro 10 mg  once a day ( 8 am)  3. Trazodone 50 mg continue once at night.       Would like to go back to meds as they were ,  Lexapro 15 mg  and no Buspirone     Buspirone did not change anything . More anxious on that regimen   Review of Systems    Objective   /73 (BP Location: Right arm, Patient Position: Sitting, BP Cuff Size: Large adult)   Pulse 75   Temp 36.8 °C (98.2 °F) (Temporal)   Resp 14   Wt 88.8 kg (195 lb 11.2 oz)   SpO2 96%   BMI 35.22 kg/m²     Physical Exam      Pertinent exam : anxious affect     Assessment/Plan   Problem List Items Addressed This Visit          Medium    Depression with anxiety    Relevant Medications    escitalopram (Lexapro) 5 mg tablet    escitalopram (Lexapro) 10 mg tablet    Other Relevant Orders    Referral to Psychiatry    Generalized anxiety disorder - Primary    Relevant Orders    Referral to Psychiatry    Myasthenia gravis without (acute) exacerbation    Type 2 diabetes mellitus with obesity (Multi)         Mod anx and depresion.  Consult psychiatry  external referral printed.     Return to previous medication and add- med for anxiety     Followup disucssed.     Followup  stress test.   Work up for shortness of breath Has to schedule it  to be done in [facility]. Has not yet scheduled.     MASOUD Williamson MD

## 2025-04-24 NOTE — TELEPHONE ENCOUNTER
"Could you please call the pharmacy and ask the pharmacist about the \"red Flag\" ?        Would Amitryptiline be okay instead ?    "

## 2025-04-24 NOTE — TELEPHONE ENCOUNTER
Spoke with Pharmacist, Ashtyn FOOTE And she stated that Amitriptyline 10mg is covered, can switch to prescribing that if you are ok with it? She said due to some of her allergies it was hard stopping the Nortriptyline.

## 2025-05-02 ENCOUNTER — PATIENT OUTREACH (OUTPATIENT)
Dept: PRIMARY CARE | Facility: CLINIC | Age: 75
End: 2025-05-02
Payer: MEDICARE

## 2025-05-02 NOTE — PROGRESS NOTES
Final call. Called and spoke with patient to address any questions or concerns regarding hospitalization.   Patient reports their condition has worsened  Patient encouraged to keep my contact information in case any needs arise.   Nam is having hard time with her anxiety, PCP gave meds and referred to specialist. Told to stay connected with family and friends

## 2025-05-25 DIAGNOSIS — K21.9 GASTROESOPHAGEAL REFLUX DISEASE WITHOUT ESOPHAGITIS: ICD-10-CM

## 2025-05-25 DIAGNOSIS — B00.9 HERPESVIRAL INFECTION, UNSPECIFIED: ICD-10-CM

## 2025-05-25 DIAGNOSIS — I10 PRIMARY HYPERTENSION: ICD-10-CM

## 2025-05-27 RX ORDER — AMLODIPINE BESYLATE 10 MG/1
10 TABLET ORAL DAILY
Qty: 90 TABLET | Refills: 1 | Status: SHIPPED | OUTPATIENT
Start: 2025-05-27

## 2025-05-27 RX ORDER — VALACYCLOVIR HYDROCHLORIDE 1 G/1
1000 TABLET, FILM COATED ORAL DAILY
Qty: 90 TABLET | Refills: 1 | Status: SHIPPED | OUTPATIENT
Start: 2025-05-27 | End: 2025-11-23

## 2025-05-27 RX ORDER — PANTOPRAZOLE SODIUM 40 MG/1
40 TABLET, DELAYED RELEASE ORAL
Qty: 90 TABLET | Refills: 1 | Status: SHIPPED | OUTPATIENT
Start: 2025-05-27

## 2025-05-29 ENCOUNTER — TELEPHONE (OUTPATIENT)
Dept: PRIMARY CARE | Facility: CLINIC | Age: 75
End: 2025-05-29

## 2025-05-29 ENCOUNTER — APPOINTMENT (OUTPATIENT)
Dept: PRIMARY CARE | Facility: CLINIC | Age: 75
End: 2025-05-29
Payer: MEDICARE

## 2025-05-29 VITALS
HEART RATE: 70 BPM | TEMPERATURE: 97 F | WEIGHT: 190 LBS | SYSTOLIC BLOOD PRESSURE: 123 MMHG | RESPIRATION RATE: 14 BRPM | BODY MASS INDEX: 34.2 KG/M2 | OXYGEN SATURATION: 98 % | DIASTOLIC BLOOD PRESSURE: 67 MMHG

## 2025-05-29 DIAGNOSIS — I48.91 ATRIAL FIB/FLUTTER, TRANSIENT (MULTI): ICD-10-CM

## 2025-05-29 DIAGNOSIS — I48.92 ATRIAL FIB/FLUTTER, TRANSIENT (MULTI): ICD-10-CM

## 2025-05-29 DIAGNOSIS — I48.91 ATRIAL FIBRILLATION, UNSPECIFIED TYPE (MULTI): Primary | ICD-10-CM

## 2025-05-29 DIAGNOSIS — F41.8 DEPRESSION WITH ANXIETY: Primary | ICD-10-CM

## 2025-05-29 DIAGNOSIS — E87.1 HYPONATREMIA: ICD-10-CM

## 2025-05-29 PROCEDURE — 1036F TOBACCO NON-USER: CPT | Performed by: FAMILY MEDICINE

## 2025-05-29 PROCEDURE — G2211 COMPLEX E/M VISIT ADD ON: HCPCS | Performed by: FAMILY MEDICINE

## 2025-05-29 PROCEDURE — 99213 OFFICE O/P EST LOW 20 MIN: CPT | Performed by: FAMILY MEDICINE

## 2025-05-29 PROCEDURE — 3078F DIAST BP <80 MM HG: CPT | Performed by: FAMILY MEDICINE

## 2025-05-29 PROCEDURE — 1159F MED LIST DOCD IN RCRD: CPT | Performed by: FAMILY MEDICINE

## 2025-05-29 PROCEDURE — 3074F SYST BP LT 130 MM HG: CPT | Performed by: FAMILY MEDICINE

## 2025-05-29 RX ORDER — AMITRIPTYLINE HYDROCHLORIDE 10 MG/1
10 TABLET, FILM COATED ORAL NIGHTLY
Qty: 30 TABLET | Refills: 5 | Status: SHIPPED | OUTPATIENT
Start: 2025-05-29 | End: 2025-11-25

## 2025-05-29 NOTE — PROGRESS NOTES
Subjective   Patient ID: Nam Bernardo is a 74 y.o. female who presents for Follow-up.  HPI  Followup anxiety.  Lexapro 15 mg  and Amitryptilline 10 mg at night , for the last month.     Counseling at [facility]  once a week    Support from [employer] (one is a retired nurse)     Wakes up and doesn't  have everything going thru her mind like before.  Trying to get out of house more.     Review of Systems  - heart thing that Stuarttkatharine put her on .  Was called and told her she had A Fib .  Overnight . And then she called my office, who said I'd review this with her today .     - how often to check sodium  - left ear plugged  - questions about labs in Jan at ER and why the low sodium went even lower.        Objective   /67 (BP Location: Right arm, Patient Position: Sitting, BP Cuff Size: Large adult)   Pulse 70   Temp 36.1 °C (97 °F) (Temporal)   Resp 14   Wt 86.2 kg (190 lb)   SpO2 98%   BMI 34.20 kg/m²     Physical Exam    Pertinent exam : TM clear bilaterally   canal - no wax  CV reg  Lungs clear     Assessment/Plan   Problem List Items Addressed This Visit          Medium    Depression with anxiety - Primary    Relevant Medications    amitriptyline (Elavil) 10 mg tablet    Hyponatremia    Relevant Orders    Basic Metabolic Panel     Other Visit Diagnoses         Atrial fib/flutter, transient (Multi)              Continue Elavil -discussed increase dose, pt declines / nervous     Hyponatremia , history of - recommed check lytes in  a few months     Hx of abnl monitor-  I was not able to locate result during pt visit.       MASOUD Williamson MD

## 2025-05-30 NOTE — TELEPHONE ENCOUNTER
Pt was in yesterday , reported that she wore a heart device from [employer] and they sent us the result, that it was abnormal .     I was not able to find this result , it was sometime in the last month (since I saw pt in April) .   Please call pt, see if there is a number/  person who we can get  the information / results from

## 2025-05-31 DIAGNOSIS — J45.30 MILD PERSISTENT ASTHMA WITHOUT COMPLICATION (HHS-HCC): ICD-10-CM

## 2025-06-03 RX ORDER — BUDESONIDE AND FORMOTEROL FUMARATE DIHYDRATE 80; 4.5 UG/1; UG/1
2 AEROSOL RESPIRATORY (INHALATION)
Qty: 10.2 G | Refills: 1 | Status: SHIPPED | OUTPATIENT
Start: 2025-06-03 | End: 2026-06-03

## 2025-06-08 NOTE — TELEPHONE ENCOUNTER
Received the result - ZIO Patch results ,  6/2/2025      Notify pt the holter test shows an abnormal heart rhythm - Atrial Fibrillation .    I'd like her to see Cardiology for an opinion on the results and plan for   what the best medication will be for this . Referral ordered    Schedule this through [facility] or via [address] at [phone] .

## 2025-07-01 PROBLEM — K57.90 DIVERTICULOSIS: Status: RESOLVED | Noted: 2025-02-07 | Resolved: 2025-07-01

## 2025-07-01 PROBLEM — E11.9 TYPE 2 DIABETES MELLITUS, WITHOUT LONG-TERM CURRENT USE OF INSULIN (MULTI): Status: ACTIVE | Noted: 2024-10-24

## 2025-07-01 PROBLEM — E66.811 CLASS 1 OBESITY WITH BODY MASS INDEX (BMI) OF 34.0 TO 34.9 IN ADULT: Status: ACTIVE | Noted: 2025-07-01

## 2025-07-02 ENCOUNTER — OFFICE VISIT (OUTPATIENT)
Dept: CARDIOLOGY | Facility: CLINIC | Age: 75
End: 2025-07-02
Payer: MEDICARE

## 2025-07-02 ENCOUNTER — APPOINTMENT (OUTPATIENT)
Dept: DERMATOLOGY | Facility: CLINIC | Age: 75
End: 2025-07-02
Payer: MEDICARE

## 2025-07-02 VITALS
WEIGHT: 192 LBS | SYSTOLIC BLOOD PRESSURE: 145 MMHG | HEIGHT: 63 IN | DIASTOLIC BLOOD PRESSURE: 65 MMHG | HEART RATE: 71 BPM | BODY MASS INDEX: 34.02 KG/M2 | OXYGEN SATURATION: 98 %

## 2025-07-02 DIAGNOSIS — I48.0 PAROXYSMAL ATRIAL FIBRILLATION (MULTI): Primary | ICD-10-CM

## 2025-07-02 DIAGNOSIS — I48.91 ATRIAL FIBRILLATION, UNSPECIFIED TYPE (MULTI): ICD-10-CM

## 2025-07-02 PROCEDURE — 3077F SYST BP >= 140 MM HG: CPT | Performed by: INTERNAL MEDICINE

## 2025-07-02 PROCEDURE — 93005 ELECTROCARDIOGRAM TRACING: CPT | Performed by: INTERNAL MEDICINE

## 2025-07-02 PROCEDURE — 99202 OFFICE O/P NEW SF 15 MIN: CPT

## 2025-07-02 PROCEDURE — 3078F DIAST BP <80 MM HG: CPT | Performed by: INTERNAL MEDICINE

## 2025-07-02 PROCEDURE — 99204 OFFICE O/P NEW MOD 45 MIN: CPT | Performed by: INTERNAL MEDICINE

## 2025-07-02 PROCEDURE — 1159F MED LIST DOCD IN RCRD: CPT | Performed by: INTERNAL MEDICINE

## 2025-07-02 PROCEDURE — 1036F TOBACCO NON-USER: CPT | Performed by: INTERNAL MEDICINE

## 2025-07-02 NOTE — PROGRESS NOTES
Reason For Consult:    Atrial Fibrillation    History Of Present Illness:    This is a 74-year-old female with a recent diagnosis of atrial fibrillation.  She is being seen today in consultation at the request of Dr. Leah Williamson.  The patient does not have palpitations, but she had a recent 2-week Holter monitor in April which was recommended by her insurance company.  At that time she was found to have paroxysmal atrial fibrillation.  Longest episode was 4 hours and 19 seconds and the total AF burden was 2%.  Her past medical history is significant for anxiety, and myasthenia gravis.    Social history: Non-smoker    Family history: Negative for premature CAD    Review of systems:  Other review of systems negative other than as outlined in HPI    Social History:  She reports that she has never smoked. She has never used smokeless tobacco. She reports that she does not currently use alcohol. She reports that she does not use drugs.    Family History:  Family History[1]    Allergies:  Amoxicillin, Benzonatate, Lisinopril, Moxifloxacin, Naproxen, Other, Paroxetine hcl, Sertraline, and Celecoxib    Medications:  Current Outpatient Medications   Medication Instructions    albuterol 90 mcg/actuation inhaler INHALE 1 TO 2 PUFFS EVERY 4 TO 6 HOURS AS NEEDED.    amitriptyline (ELAVIL) 10 mg, oral, Nightly    amLODIPine (NORVASC) 10 mg, oral, Daily    apixaban (ELIQUIS) 5 mg, oral, 2 times daily    blood sugar diagnostic (OneTouch Ultra Test) strip Use to Check Blood Sugar 2 times a day    budesonide-formoterol (Symbicort) 80-4.5 mcg/actuation inhaler 2 puffs, inhalation, 2 times daily RT    cholecalciferol (VITAMIN D3) 50 mcg, Daily    escitalopram (LEXAPRO) 5 mg, oral, Every Day, Take one tablet by mouth daily with 10 mg Escitalopram for a total of 15 mg    escitalopram (LEXAPRO) 10 mg, oral, Daily    fluticasone (Flonase) 50 mcg/actuation nasal spray 2 sprays, Each Nostril, Daily, Shake gently. Before first use, prime  "pump. After use, clean tip and replace cap.    hydroCHLOROthiazide (MICROZIDE) 12.5 mg, oral, Daily    hydrocortisone 2.5 % cream Topical, 2 times daily    ketoconazole (NIZOral) 2 % cream Daily to affected areas on face until controlled, then 1-2 times weekly    levothyroxine (SYNTHROID, LEVOXYL) 112 mcg, oral, Every Day    loratadine (Claritin) 10 mg tablet Take by mouth.    losartan (COZAAR) 100 mg, oral, Daily    melatonin-pyridoxine HCl, B6, 5-10 mg tablet extended release 5-10 mg, Nightly    montelukast (SINGULAIR) 10 mg, oral, Every Day    multivitamin with minerals iron-free (Centrum Silver) 1 tablet, Daily    mycophenolate (CELLCEPT) 1,000 mg, 2 times daily    nystatin (Mycostatin) 100,000 unit/gram powder 1 Application, Topical, 2 times daily    nystatin-triamcinolone (Mycolog II) ointment Topical, 2 times daily, Instead of Kenalog 0.1% cream    pantoprazole (PROTONIX) 40 mg, oral, Every Day    pravastatin (PRAVACHOL) 10 mg, oral, Every Day    Prolia 60 mg    pyridostigmine (MESTINON) 60 mg, 3 times daily    traZODone (DESYREL) 50 mg, oral, Nightly    valACYclovir (VALTREX) 1,000 mg, oral, Daily       Vitals:      12/18/2024    11:33 AM 2/7/2025     3:44 PM 3/26/2025    12:32 PM 4/9/2025     4:00 PM 4/24/2025     1:25 PM 5/29/2025     3:31 PM 7/2/2025    11:49 AM   Vitals   Systolic 121 138 153 145 132 123 145   Diastolic 66 72 68 74 73 67 65   BP Location Right arm Left arm  Left arm Right arm Right arm Left arm   Heart Rate 66 82 77 75 75 70 71   Temp 36.4 °C (97.5 °F) 37.2 °C (98.9 °F) 36.7 °C (98 °F) 36.3 °C (97.4 °F) 36.8 °C (98.2 °F) 36.1 °C (97 °F)    Resp 12 14 20 16 14 14    Height   1.588 m (5' 2.5\")    1.588 m (5' 2.5\")   Weight (lb) 221.5 218 205 199 195.7 190 192   BMI 39.24 kg/m2 38.62 kg/m2 36.9 kg/m2 35.82 kg/m2 35.22 kg/m2 34.2 kg/m2 34.56 kg/m2   BSA (m2) 2.11 m2 2.1 m2 2.03 m2 2 m2 1.98 m2 1.95 m2 1.96 m2   Visit Report Report Report Report Report Report Report Report       Physical " "Exam:    General Appearance:  Alert, oriented, no distress  Skin:  Warm and dry  Head and Neck:  No elevation of JVP, no carotid bruits  Cardiac Exam:  Rhythm is regular, S1 and S2 are normal, no murmur S3 or S4  Lungs:  Clear to auscultation  Extremities:  no edema  Neurologic:  No focal deficits  Psychiatric:  Appropriate mood and behavior    Lab Results:     CMP:  Recent Labs     04/04/25  1411 02/27/25  1110 02/13/25  1350 12/19/24  1051    140 139 133*   K 4.4 4.6 4.4 4.4    107 104 97*   CO2 22 23 23 29   ANIONGAP 12 10 12 11   BUN 23 16 20 14   CREATININE 1.16* 0.62 0.58* 0.68   EGFR 49* 92 94 90     Recent Labs     12/19/24  1051   ALBUMIN 4.5   ALKPHOS 68   ALT 13   AST 17   BILITOT 0.6     CBC:  Recent Labs     12/19/24  1051   WBC 6.6   HGB 13.2   HCT 37.7      MCV 87     COAG: No results for input(s): \"PTT\", \"INR\", \"HAUF\", \"DDIMERVTE\", \"HAPTOGLOBIN\", \"FIBRINOGEN\" in the last 8760 hours.  HEME/ENDO:  Recent Labs     04/04/25  1411 10/24/24  1358   TSH 0.70  --    HGBA1C  --  5.6      CARDIAC: No results for input(s): \"LDH\", \"CKMB\", \"TROPHS\", \"BNP\" in the last 8760 hours.    No lab exists for component: \"CK\", \"CKMBP\"No results for input(s): \"CHOL\", \"LDLF\", \"HDL\", \"TRIG\" in the last 8760 hours.    Test Results (personally reviewed):    Holter monitor April 2025: Paroxysmal atrial fibrillation, 2% AF burden    Assessment/Plan  Problem List Items Addressed This Visit           ICD-10-CM    Paroxysmal atrial fibrillation (Multi) - Primary I48.0    This patient was recently found to have atrial fibrillation on a 2-week ultra monitor.  The AF burden was 2%, and the patient is asymptomatic.  Based on her chads 2 VASc score, anticoagulation is recommended for stroke risk reduction.  Eliquis 5 mg twice daily is recommended.  Potential bleeding side effects were discussed.  She is asymptomatic and the AF burden overall is low and we will hold off on antiarrhythmic drug therapy at this time.  We " will plan for periodic Holter monitoring to reassess the AF burden and then treat pharmacologically if necessary.  EP follow-up in 6 to 8 months.  An echocardiogram will be obtained to assess the left ventricular systolic function and left atrial size.         Relevant Medications    apixaban (Eliquis) 5 mg tablet    Other Relevant Orders    Transthoracic echo (TTE) complete     Other Visit Diagnoses         Codes      Atrial fibrillation, unspecified type (Multi)     I48.91    Relevant Orders    ECG 12 lead (Clinic Performed)          James C Ramicone, DO         [1]   Family History  Problem Relation Name Age of Onset    Other (Other) Mother          Cerebrovascular Disorder, Malignant Neoplasm    Lymphoma Father      Other (Other) Sister          Malignant Neoplasm    Other cancer Brother          Malignant Neoplasm    Coronary artery disease Sibling

## 2025-07-02 NOTE — PATIENT INSTRUCTIONS
Echocardiogram at [facility] location.    Follow up with Dr. Ramicone in 6-8 months.

## 2025-07-03 LAB
ATRIAL RATE: 66 BPM
P AXIS: 63 DEGREES
P OFFSET: 217 MS
P ONSET: 145 MS
PR INTERVAL: 162 MS
Q ONSET: 226 MS
QRS COUNT: 11 BEATS
QRS DURATION: 88 MS
QT INTERVAL: 434 MS
QTC CALCULATION(BAZETT): 454 MS
QTC FREDERICIA: 448 MS
R AXIS: 68 DEGREES
T AXIS: -71 DEGREES
T OFFSET: 443 MS
VENTRICULAR RATE: 66 BPM

## 2025-07-03 NOTE — ASSESSMENT & PLAN NOTE
This patient was recently found to have atrial fibrillation on a 2-week ultra monitor.  The AF burden was 2%, and the patient is asymptomatic.  Based on her chads 2 VASc score, anticoagulation is recommended for stroke risk reduction.  Eliquis 5 mg twice daily is recommended.  Potential bleeding side effects were discussed.  She is asymptomatic and the AF burden overall is low and we will hold off on antiarrhythmic drug therapy at this time.  We will plan for periodic Holter monitoring to reassess the AF burden and then treat pharmacologically if necessary.  EP follow-up in 6 to 8 months.  An echocardiogram will be obtained to assess the left ventricular systolic function and left atrial size.

## 2025-07-16 ENCOUNTER — HOSPITAL ENCOUNTER (OUTPATIENT)
Dept: CARDIOLOGY | Facility: CLINIC | Age: 75
Discharge: HOME | End: 2025-07-16
Payer: MEDICARE

## 2025-07-16 DIAGNOSIS — I48.0 PAROXYSMAL ATRIAL FIBRILLATION (MULTI): ICD-10-CM

## 2025-07-16 LAB
ANION GAP SERPL CALCULATED.4IONS-SCNC: 9 MMOL/L (CALC) (ref 7–17)
BUN SERPL-MCNC: 19 MG/DL (ref 7–25)
BUN/CREAT SERPL: NORMAL (CALC) (ref 6–22)
CALCIUM SERPL-MCNC: 10 MG/DL (ref 8.6–10.4)
CHLORIDE SERPL-SCNC: 103 MMOL/L (ref 98–110)
CO2 SERPL-SCNC: 26 MMOL/L (ref 20–32)
CREAT SERPL-MCNC: 0.72 MG/DL (ref 0.6–1)
EGFRCR SERPLBLD CKD-EPI 2021: 87 ML/MIN/1.73M2
GLUCOSE SERPL-MCNC: 66 MG/DL (ref 65–99)
POTASSIUM SERPL-SCNC: 4.9 MMOL/L (ref 3.5–5.3)
SODIUM SERPL-SCNC: 138 MMOL/L (ref 135–146)

## 2025-07-16 PROCEDURE — 93306 TTE W/DOPPLER COMPLETE: CPT

## 2025-07-16 PROCEDURE — 93306 TTE W/DOPPLER COMPLETE: CPT | Performed by: STUDENT IN AN ORGANIZED HEALTH CARE EDUCATION/TRAINING PROGRAM

## 2025-07-18 LAB
EJECTION FRACTION APICAL 4 CHAMBER: 61
EJECTION FRACTION: 63 %
LEFT ATRIUM VOLUME AREA LENGTH INDEX BSA: 32.7 ML/M2
LEFT VENTRICLE INTERNAL DIMENSION DIASTOLE: 4.2 CM (ref 3.5–6)
LEFT VENTRICULAR OUTFLOW TRACT DIAMETER: 2 CM
MITRAL VALVE E/A RATIO: 0.64
RIGHT VENTRICLE FREE WALL PEAK S': 17.9 CM/S
TRICUSPID ANNULAR PLANE SYSTOLIC EXCURSION: 1.8 CM

## 2025-07-24 ENCOUNTER — APPOINTMENT (OUTPATIENT)
Dept: DERMATOLOGY | Facility: CLINIC | Age: 75
End: 2025-07-24
Payer: MEDICARE

## 2025-08-15 DIAGNOSIS — J45.30 MILD PERSISTENT ASTHMA WITHOUT COMPLICATION (HHS-HCC): ICD-10-CM

## 2025-08-16 RX ORDER — BUDESONIDE AND FORMOTEROL FUMARATE DIHYDRATE 80; 4.5 UG/1; UG/1
2 AEROSOL RESPIRATORY (INHALATION)
Qty: 10.2 G | Refills: 1 | Status: SHIPPED | OUTPATIENT
Start: 2025-08-16 | End: 2026-08-16

## 2025-08-22 ENCOUNTER — APPOINTMENT (OUTPATIENT)
Dept: DERMATOLOGY | Facility: CLINIC | Age: 75
End: 2025-08-22
Payer: MEDICARE

## 2025-08-22 DIAGNOSIS — L81.4 LENTIGO: ICD-10-CM

## 2025-08-22 DIAGNOSIS — Z12.83 ENCOUNTER FOR SCREENING FOR MALIGNANT NEOPLASM OF SKIN: ICD-10-CM

## 2025-08-22 DIAGNOSIS — D18.01 HEMANGIOMA OF SKIN: ICD-10-CM

## 2025-08-22 DIAGNOSIS — L82.1 SEBORRHEIC KERATOSIS: Primary | ICD-10-CM

## 2025-08-22 PROCEDURE — 99213 OFFICE O/P EST LOW 20 MIN: CPT | Performed by: NURSE PRACTITIONER

## 2025-08-22 PROCEDURE — 1160F RVW MEDS BY RX/DR IN RCRD: CPT | Performed by: NURSE PRACTITIONER

## 2025-08-22 PROCEDURE — 1036F TOBACCO NON-USER: CPT | Performed by: NURSE PRACTITIONER

## 2025-08-22 PROCEDURE — 1159F MED LIST DOCD IN RCRD: CPT | Performed by: NURSE PRACTITIONER

## 2025-08-30 DIAGNOSIS — F41.8 DEPRESSION WITH ANXIETY: ICD-10-CM

## 2025-08-30 DIAGNOSIS — F51.01 PRIMARY INSOMNIA: ICD-10-CM

## 2025-08-30 DIAGNOSIS — E03.9 HYPOTHYROIDISM, UNSPECIFIED TYPE: ICD-10-CM

## 2025-09-02 RX ORDER — LEVOTHYROXINE SODIUM 112 UG/1
112 TABLET ORAL
Qty: 90 TABLET | Refills: 1 | Status: SHIPPED | OUTPATIENT
Start: 2025-09-02

## 2025-09-02 RX ORDER — TRAZODONE HYDROCHLORIDE 50 MG/1
50 TABLET ORAL NIGHTLY
Qty: 90 TABLET | Refills: 1 | Status: SHIPPED | OUTPATIENT
Start: 2025-09-02

## 2025-10-30 ENCOUNTER — APPOINTMENT (OUTPATIENT)
Dept: PRIMARY CARE | Facility: CLINIC | Age: 75
End: 2025-10-30
Payer: MEDICARE

## 2026-08-21 ENCOUNTER — APPOINTMENT (OUTPATIENT)
Dept: DERMATOLOGY | Facility: CLINIC | Age: 76
End: 2026-08-21
Payer: MEDICARE